# Patient Record
Sex: MALE | Race: WHITE | NOT HISPANIC OR LATINO | Employment: OTHER | URBAN - METROPOLITAN AREA
[De-identification: names, ages, dates, MRNs, and addresses within clinical notes are randomized per-mention and may not be internally consistent; named-entity substitution may affect disease eponyms.]

---

## 2018-03-06 ENCOUNTER — TRANSCRIBE ORDERS (OUTPATIENT)
Dept: LAB | Facility: HOSPITAL | Age: 48
End: 2018-03-06

## 2018-03-06 ENCOUNTER — APPOINTMENT (OUTPATIENT)
Dept: LAB | Facility: HOSPITAL | Age: 48
End: 2018-03-06
Attending: PHYSICAL MEDICINE & REHABILITATION
Payer: OTHER MISCELLANEOUS

## 2018-03-06 DIAGNOSIS — I76 SEPTIC EMBOLISM (CMS/HCC): ICD-10-CM

## 2018-03-06 DIAGNOSIS — I76 SEPTIC EMBOLISM (CMS/HCC): Primary | ICD-10-CM

## 2018-03-06 LAB
INR PPP: 2.1 INR
PROTHROMBIN TIME: 23.9 SEC. (ref 12.2–14.5)

## 2018-03-06 PROCEDURE — 85610 PROTHROMBIN TIME: CPT

## 2018-03-06 PROCEDURE — 36415 COLL VENOUS BLD VENIPUNCTURE: CPT

## 2018-05-16 ENCOUNTER — TRANSCRIBE ORDERS (OUTPATIENT)
Dept: LAB | Facility: HOSPITAL | Age: 48
End: 2018-05-16

## 2018-05-16 ENCOUNTER — APPOINTMENT (OUTPATIENT)
Dept: LAB | Facility: HOSPITAL | Age: 48
End: 2018-05-16
Attending: PSYCHIATRY & NEUROLOGY
Payer: OTHER MISCELLANEOUS

## 2018-05-16 DIAGNOSIS — Z51.81 ENCOUNTER FOR THERAPEUTIC DRUG MONITORING: ICD-10-CM

## 2018-05-16 DIAGNOSIS — Z51.81 ENCOUNTER FOR THERAPEUTIC DRUG MONITORING: Primary | ICD-10-CM

## 2018-05-16 LAB — CARBAMAZEPINE SERPL-MCNC: 8.7 UG/ML (ref 8–12)

## 2018-05-16 PROCEDURE — 36415 COLL VENOUS BLD VENIPUNCTURE: CPT

## 2018-05-16 PROCEDURE — 80156 ASSAY CARBAMAZEPINE TOTAL: CPT

## 2018-06-27 ENCOUNTER — HOSPITAL ENCOUNTER (INPATIENT)
Facility: HOSPITAL | Age: 48
LOS: 2 days | End: 2018-06-29
Attending: EMERGENCY MEDICINE | Admitting: HOSPITALIST
Payer: OTHER MISCELLANEOUS

## 2018-06-27 ENCOUNTER — APPOINTMENT (EMERGENCY)
Dept: RADIOLOGY | Facility: HOSPITAL | Age: 48
End: 2018-06-27
Payer: OTHER MISCELLANEOUS

## 2018-06-27 DIAGNOSIS — E87.1 HYPONATREMIA: ICD-10-CM

## 2018-06-27 DIAGNOSIS — R56.9 SEIZURE (CMS/HCC): Primary | ICD-10-CM

## 2018-06-27 PROBLEM — I10 ESSENTIAL HYPERTENSION: Status: ACTIVE | Noted: 2018-06-27

## 2018-06-27 PROBLEM — S06.9XAA TRAUMATIC BRAIN INJURY (CMS/HCC): Status: ACTIVE | Noted: 2018-06-27

## 2018-06-27 LAB
ALBUMIN SERPL-MCNC: 4.6 G/DL (ref 3.4–5)
ALP SERPL-CCNC: 74 IU/L (ref 35–126)
ALT SERPL-CCNC: 27 IU/L (ref 16–63)
ANION GAP SERPL CALC-SCNC: 14 MEQ/L (ref 3–15)
ANION GAP SERPL CALC-SCNC: 7 MEQ/L (ref 3–15)
AST SERPL-CCNC: 24 IU/L (ref 15–41)
BACTERIA URNS QL MICRO: NORMAL /UL
BASOPHILS # BLD: 0.03 K/UL (ref 0.01–0.1)
BASOPHILS NFR BLD: 0.3 %
BILIRUB SERPL-MCNC: 0.8 MG/DL (ref 0.3–1.2)
BILIRUB UR QL STRIP.AUTO: NEGATIVE MG/DL
BUN SERPL-MCNC: 12 MG/DL (ref 8–20)
BUN SERPL-MCNC: 9 MG/DL (ref 8–20)
CALCIUM SERPL-MCNC: 9.2 MG/DL (ref 8.9–10.3)
CALCIUM SERPL-MCNC: 9.3 MG/DL (ref 8.9–10.3)
CARBAMAZEPINE SERPL-MCNC: 6.1 UG/ML (ref 8–12)
CHLORIDE SERPL-SCNC: 90 MMOL/L (ref 98–109)
CHLORIDE SERPL-SCNC: 91 MMOL/L (ref 98–109)
CLARITY UR REFRACT.AUTO: CLEAR
CO2 SERPL-SCNC: 17 MMOL/L (ref 22–32)
CO2 SERPL-SCNC: 26 MMOL/L (ref 22–32)
COLOR UR AUTO: YELLOW
CREAT SERPL-MCNC: 0.8 MG/DL (ref 0.8–1.3)
CREAT SERPL-MCNC: 0.9 MG/DL (ref 0.8–1.3)
DIFFERENTIAL METHOD BLD: ABNORMAL
EOSINOPHIL # BLD: 0.28 K/UL (ref 0.04–0.54)
EOSINOPHIL NFR BLD: 3.1 %
ERYTHROCYTE [DISTWIDTH] IN BLOOD BY AUTOMATED COUNT: 13.3 % (ref 11.6–14.4)
GFR SERPL CREATININE-BSD FRML MDRD: >60 ML/MIN/1.73M*2
GFR SERPL CREATININE-BSD FRML MDRD: >60 ML/MIN/1.73M*2
GLUCOSE SERPL-MCNC: 101 MG/DL (ref 70–99)
GLUCOSE SERPL-MCNC: 112 MG/DL (ref 70–99)
GLUCOSE UR STRIP.AUTO-MCNC: NEGATIVE MG/DL
HCT VFR BLDCO AUTO: 38.4 % (ref 40–51)
HGB BLD-MCNC: 13.6 G/DL (ref 13.7–17.5)
HGB UR QL STRIP.AUTO: NEGATIVE
HYALINE CASTS #/AREA URNS LPF: NORMAL /LPF
IMM GRANULOCYTES # BLD AUTO: 0.18 K/UL (ref 0–0.08)
IMM GRANULOCYTES NFR BLD AUTO: 2 %
KETONES UR STRIP.AUTO-MCNC: NEGATIVE MG/DL
LEUKOCYTE ESTERASE UR QL STRIP.AUTO: NEGATIVE
LYMPHOCYTES # BLD: 1.24 K/UL (ref 1.2–3.5)
LYMPHOCYTES NFR BLD: 13.6 %
MAGNESIUM SERPL-MCNC: 2.1 MG/DL (ref 1.8–2.5)
MCH RBC QN AUTO: 30.4 PG (ref 28–33.2)
MCHC RBC AUTO-ENTMCNC: 35.4 G/DL (ref 32.2–36.5)
MCV RBC AUTO: 85.9 FL (ref 83–98)
MONOCYTES # BLD: 0.91 K/UL (ref 0.3–1)
MONOCYTES NFR BLD: 10 %
NEUTROPHILS # BLD: 6.49 K/UL (ref 1.7–7)
NEUTS SEG NFR BLD: 71 %
NITRITE UR QL STRIP.AUTO: NEGATIVE
NRBC BLD-RTO: 0 %
OSMOLALITY SERPL: 260 MOSM/KG (ref 280–300)
PDW BLD AUTO: 8.3 FL (ref 9.4–12.4)
PH UR STRIP.AUTO: 7 [PH]
PLATELET # BLD AUTO: 252 K/UL (ref 150–350)
POTASSIUM SERPL-SCNC: 3.6 MMOL/L (ref 3.6–5.1)
POTASSIUM SERPL-SCNC: 3.9 MMOL/L (ref 3.6–5.1)
PROT SERPL-MCNC: 7.7 G/DL (ref 6–8.2)
PROT UR QL STRIP.AUTO: 1
RBC # BLD AUTO: 4.47 M/UL (ref 4.5–5.8)
RBC #/AREA URNS HPF: NORMAL /HPF
SODIUM SERPL-SCNC: 121 MMOL/L (ref 136–144)
SODIUM SERPL-SCNC: 124 MMOL/L (ref 136–144)
SP GR UR REFRACT.AUTO: 1.01
SQUAMOUS URNS QL MICRO: NORMAL /HPF
TROPONIN I SERPL-MCNC: <0.02 NG/ML
UROBILINOGEN UR STRIP-ACNC: 0.2 EU/DL
WBC # BLD AUTO: 9.13 K/UL (ref 3.8–10.5)
WBC #/AREA URNS HPF: NORMAL /HPF

## 2018-06-27 PROCEDURE — 84300 ASSAY OF URINE SODIUM: CPT | Performed by: EMERGENCY MEDICINE

## 2018-06-27 PROCEDURE — 63600000 HC DRUGS/DETAIL CODE: Performed by: EMERGENCY MEDICINE

## 2018-06-27 PROCEDURE — 25800000 HC PHARMACY IV SOLUTIONS: Performed by: HOSPITALIST

## 2018-06-27 PROCEDURE — 36415 COLL VENOUS BLD VENIPUNCTURE: CPT | Performed by: PHYSICIAN ASSISTANT

## 2018-06-27 PROCEDURE — 99291 CRITICAL CARE FIRST HOUR: CPT | Performed by: HOSPITALIST

## 2018-06-27 PROCEDURE — 83930 ASSAY OF BLOOD OSMOLALITY: CPT | Performed by: EMERGENCY MEDICINE

## 2018-06-27 PROCEDURE — 70450 CT HEAD/BRAIN W/O DYE: CPT

## 2018-06-27 PROCEDURE — 84484 ASSAY OF TROPONIN QUANT: CPT | Performed by: EMERGENCY MEDICINE

## 2018-06-27 PROCEDURE — 99222 1ST HOSP IP/OBS MODERATE 55: CPT | Performed by: HOSPITALIST

## 2018-06-27 PROCEDURE — 83735 ASSAY OF MAGNESIUM: CPT | Performed by: EMERGENCY MEDICINE

## 2018-06-27 PROCEDURE — 81001 URINALYSIS AUTO W/SCOPE: CPT | Performed by: EMERGENCY MEDICINE

## 2018-06-27 PROCEDURE — 80048 BASIC METABOLIC PNL TOTAL CA: CPT | Performed by: HOSPITALIST

## 2018-06-27 PROCEDURE — 80156 ASSAY CARBAMAZEPINE TOTAL: CPT | Performed by: PHYSICIAN ASSISTANT

## 2018-06-27 PROCEDURE — 63700000 HC SELF-ADMINISTRABLE DRUG: Performed by: HOSPITALIST

## 2018-06-27 PROCEDURE — 36415 COLL VENOUS BLD VENIPUNCTURE: CPT | Performed by: HOSPITALIST

## 2018-06-27 PROCEDURE — 99285 EMERGENCY DEPT VISIT HI MDM: CPT | Mod: 25

## 2018-06-27 PROCEDURE — 83935 ASSAY OF URINE OSMOLALITY: CPT | Performed by: EMERGENCY MEDICINE

## 2018-06-27 PROCEDURE — 93005 ELECTROCARDIOGRAM TRACING: CPT | Performed by: PHYSICIAN ASSISTANT

## 2018-06-27 PROCEDURE — 20000000 HC ROOM AND CARE ICU

## 2018-06-27 PROCEDURE — 80053 COMPREHEN METABOLIC PANEL: CPT | Performed by: PHYSICIAN ASSISTANT

## 2018-06-27 PROCEDURE — 85025 COMPLETE CBC W/AUTO DIFF WBC: CPT | Performed by: PHYSICIAN ASSISTANT

## 2018-06-27 RX ORDER — LOSARTAN POTASSIUM 100 MG/1
100 TABLET ORAL EVERY MORNING
COMMUNITY

## 2018-06-27 RX ORDER — ATORVASTATIN CALCIUM 10 MG/1
10 TABLET, FILM COATED ORAL DAILY
Status: DISCONTINUED | OUTPATIENT
Start: 2018-06-28 | End: 2018-06-29 | Stop reason: HOSPADM

## 2018-06-27 RX ORDER — AMLODIPINE BESYLATE 10 MG/1
10 TABLET ORAL EVERY MORNING
COMMUNITY

## 2018-06-27 RX ORDER — TRAMADOL HYDROCHLORIDE 50 MG/1
100 TABLET ORAL
COMMUNITY
End: 2018-06-27

## 2018-06-27 RX ORDER — FLUTICASONE PROPIONATE 50 MCG
2 SPRAY, SUSPENSION (ML) NASAL EVERY MORNING
COMMUNITY

## 2018-06-27 RX ORDER — PROPRANOLOL HYDROCHLORIDE 40 MG/1
40 TABLET ORAL 2 TIMES DAILY
COMMUNITY
End: 2018-06-27

## 2018-06-27 RX ORDER — SODIUM CHLORIDE 9 MG/ML
INJECTION, SOLUTION INTRAVENOUS CONTINUOUS
Status: DISCONTINUED | OUTPATIENT
Start: 2018-06-27 | End: 2018-06-28

## 2018-06-27 RX ORDER — AMANTADINE HYDROCHLORIDE 100 MG/1
100 CAPSULE, GELATIN COATED ORAL 2 TIMES DAILY
COMMUNITY
End: 2018-06-27

## 2018-06-27 RX ORDER — ACETAMINOPHEN 325 MG/1
650 TABLET ORAL EVERY 4 HOURS PRN
Status: DISCONTINUED | OUTPATIENT
Start: 2018-06-27 | End: 2018-06-29 | Stop reason: HOSPADM

## 2018-06-27 RX ORDER — SIMVASTATIN 20 MG/1
20 TABLET, FILM COATED ORAL NIGHTLY
COMMUNITY
End: 2018-06-27

## 2018-06-27 RX ORDER — ESZOPICLONE 1 MG/1
1 TABLET, FILM COATED ORAL NIGHTLY
COMMUNITY

## 2018-06-27 RX ORDER — POTASSIUM CHLORIDE 14.9 MG/ML
20 INJECTION INTRAVENOUS DAILY PRN
Status: DISCONTINUED | OUTPATIENT
Start: 2018-06-27 | End: 2018-06-28

## 2018-06-27 RX ORDER — DEXTROSE 40 %
15-30 GEL (GRAM) ORAL AS NEEDED
Status: DISCONTINUED | OUTPATIENT
Start: 2018-06-27 | End: 2018-06-29 | Stop reason: HOSPADM

## 2018-06-27 RX ORDER — SIMVASTATIN 20 MG/1
20 TABLET, FILM COATED ORAL NIGHTLY
COMMUNITY

## 2018-06-27 RX ORDER — LORAZEPAM 2 MG/ML
1 INJECTION INTRAMUSCULAR ONCE
Status: COMPLETED | OUTPATIENT
Start: 2018-06-27 | End: 2018-06-27

## 2018-06-27 RX ORDER — IBUPROFEN 200 MG
16-32 TABLET ORAL AS NEEDED
Status: DISCONTINUED | OUTPATIENT
Start: 2018-06-27 | End: 2018-06-29 | Stop reason: HOSPADM

## 2018-06-27 RX ORDER — CARBAMAZEPINE 100 MG/1
400 TABLET, CHEWABLE ORAL NIGHTLY
Status: DISCONTINUED | OUTPATIENT
Start: 2018-06-27 | End: 2018-06-27

## 2018-06-27 RX ORDER — KETOROLAC TROMETHAMINE 15 MG/ML
15 INJECTION, SOLUTION INTRAMUSCULAR; INTRAVENOUS EVERY 6 HOURS PRN
Status: DISCONTINUED | OUTPATIENT
Start: 2018-06-27 | End: 2018-06-29 | Stop reason: HOSPADM

## 2018-06-27 RX ORDER — CARBAMAZEPINE 400 MG/1
1200 TABLET, EXTENDED RELEASE ORAL NIGHTLY
COMMUNITY

## 2018-06-27 RX ORDER — PROPRANOLOL HYDROCHLORIDE 20 MG/1
40 TABLET ORAL 2 TIMES DAILY
Status: DISCONTINUED | OUTPATIENT
Start: 2018-06-27 | End: 2018-06-29 | Stop reason: HOSPADM

## 2018-06-27 RX ORDER — AMANTADINE HYDROCHLORIDE 100 MG/1
50 CAPSULE, GELATIN COATED ORAL 2 TIMES DAILY
COMMUNITY
End: 2018-06-29 | Stop reason: HOSPADM

## 2018-06-27 RX ORDER — LOSARTAN POTASSIUM 100 MG/1
100 TABLET ORAL DAILY
Status: DISCONTINUED | OUTPATIENT
Start: 2018-06-28 | End: 2018-06-29 | Stop reason: HOSPADM

## 2018-06-27 RX ORDER — CARBAMAZEPINE 200 MG/1
1200 TABLET, EXTENDED RELEASE ORAL DAILY
Status: DISCONTINUED | OUTPATIENT
Start: 2018-06-28 | End: 2018-06-29 | Stop reason: HOSPADM

## 2018-06-27 RX ORDER — DIAZEPAM 10 MG/2ML
5 INJECTION INTRAMUSCULAR EVERY 6 HOURS PRN
Status: DISCONTINUED | OUTPATIENT
Start: 2018-06-27 | End: 2018-06-29 | Stop reason: HOSPADM

## 2018-06-27 RX ORDER — TRAMADOL HYDROCHLORIDE 50 MG/1
100 TABLET ORAL
Status: DISCONTINUED | OUTPATIENT
Start: 2018-06-28 | End: 2018-06-27

## 2018-06-27 RX ORDER — PROPRANOLOL HYDROCHLORIDE 40 MG/1
40 TABLET ORAL 2 TIMES DAILY
COMMUNITY

## 2018-06-27 RX ORDER — ONDANSETRON HYDROCHLORIDE 2 MG/ML
4 INJECTION, SOLUTION INTRAVENOUS EVERY 8 HOURS PRN
Status: DISCONTINUED | OUTPATIENT
Start: 2018-06-27 | End: 2018-06-29 | Stop reason: HOSPADM

## 2018-06-27 RX ORDER — DEXTROSE 50 % IN WATER (D50W) INTRAVENOUS SYRINGE
25 AS NEEDED
Status: DISCONTINUED | OUTPATIENT
Start: 2018-06-27 | End: 2018-06-29 | Stop reason: HOSPADM

## 2018-06-27 RX ORDER — POTASSIUM CHLORIDE 750 MG/1
20 TABLET, FILM COATED, EXTENDED RELEASE ORAL ONCE
Status: COMPLETED | OUTPATIENT
Start: 2018-06-28 | End: 2018-06-27

## 2018-06-27 RX ORDER — AMANTADINE HYDROCHLORIDE 100 MG/1
100 CAPSULE, GELATIN COATED ORAL 2 TIMES DAILY
Status: DISCONTINUED | OUTPATIENT
Start: 2018-06-28 | End: 2018-06-28

## 2018-06-27 RX ORDER — AMLODIPINE BESYLATE 10 MG/1
10 TABLET ORAL DAILY
Status: DISCONTINUED | OUTPATIENT
Start: 2018-06-28 | End: 2018-06-29 | Stop reason: HOSPADM

## 2018-06-27 RX ORDER — MIRTAZAPINE 15 MG/1
45 TABLET, FILM COATED ORAL
Status: DISCONTINUED | OUTPATIENT
Start: 2018-06-28 | End: 2018-06-27

## 2018-06-27 RX ORDER — POLYETHYLENE GLYCOL 3350 17 G/17G
17 POWDER, FOR SOLUTION ORAL DAILY
Status: DISCONTINUED | OUTPATIENT
Start: 2018-06-28 | End: 2018-06-29 | Stop reason: HOSPADM

## 2018-06-27 RX ORDER — ONDANSETRON 4 MG/1
4 TABLET, ORALLY DISINTEGRATING ORAL EVERY 8 HOURS PRN
Status: DISCONTINUED | OUTPATIENT
Start: 2018-06-27 | End: 2018-06-29 | Stop reason: HOSPADM

## 2018-06-27 RX ADMIN — LORAZEPAM 1 MG: 2 INJECTION INTRAMUSCULAR; INTRAVENOUS at 17:45

## 2018-06-27 RX ADMIN — CARBAMAZEPINE 1200 MG: 200 TABLET, EXTENDED RELEASE ORAL at 23:57

## 2018-06-27 RX ADMIN — POTASSIUM CHLORIDE 20 MEQ: 750 TABLET, EXTENDED RELEASE ORAL at 23:57

## 2018-06-27 RX ADMIN — SODIUM CHLORIDE: 9 INJECTION, SOLUTION INTRAVENOUS at 20:56

## 2018-06-27 ASSESSMENT — COGNITIVE AND FUNCTIONAL STATUS - GENERAL
CLIMB 3 TO 5 STEPS WITH RAILING: 4 - NONE
MOVING TO AND FROM BED TO CHAIR: 4 - NONE
EATING MEALS: 3 - A LITTLE
WALKING IN HOSPITAL ROOM: 4 - NONE
HELP NEEDED FOR PERSONAL GROOMING: 3 - A LITTLE
STANDING UP FROM CHAIR USING ARMS: 4 - NONE
DRESSING REGULAR UPPER BODY CLOTHING: 4 - NONE
HELP NEEDED FOR BATHING: 4 - NONE
TOILETING: 4 - NONE
DRESSING REGULAR LOWER BODY CLOTHING: 4 - NONE

## 2018-06-27 ASSESSMENT — ENCOUNTER SYMPTOMS: SEIZURES: 1

## 2018-06-27 NOTE — ED ATTESTATION NOTE
4:54 PM  I have personally seen and examined the patient.  I reviewed and agree with physician assistant / nurse practitioner’s assessment and plan of care, with the following exceptions: None  My examination, assessment, and plan of care of Christopher Mattson is as follows:     48-year-old male from [facility] is here because he suffered a tonic/clonic seizure.  His caretaker states that he has no history of previous seizures, however he is on Tegretol for mood control.  There is a list of previous medications on his record documenting the use of valproic acid.  The patient is antsy according to his caretaker.  He denies biting his tongue or suffering incontinence.  Examination does in fact reveal a left tongue abrasion.  Heart is regular without tachycardia.  Lungs are clear and abdomen is soft and nontender.  There is 3+ reflexes in his lower extremities.  The caretaker states that the patient has been drinking quite a bit of free water today.  The patient will be evaluated with CAT scan of the brain and lab work, including Tegretol level.    5:34 PM  Patient electrolytes reveal a sodium of 121, chloride of 70, serum CO2 of 17.  The patient will need to be admitted with fluid restriction and I am giving him a dose of Ativan at this time.  Admitted to the [facility] service.  Critical care 30 minutes.       I was physically present for the key/critical portions of the following procedures: None           Rey Wyman, DO  06/27/18 1735       Rey Wyman, DO  06/27/18 1739

## 2018-06-27 NOTE — H&P
Hospital Medicine Service -  History & Physical        CHIEF COMPLAINT     Chief Complaint   Patient presents with   • Seizures        HISTORY OF PRESENT ILLNESS      48 y.o. male with a past medical history of TBI with subdural hematoma, hypertension. Lives at drug rehab facility and will be discharged on 6/29/2018.  Patient was sitting where in front of the  patient had a tonic-clonic seizure no loss of continence.  According to staff members it lasted for 3 minutes as EMS arrived and patient was postictal at that time.  Here in the ED patient only remembers the portion of the ambulance ride.  Complains of dizziness headache.  Patient does admit to drinking at least a gallon and a half of water daily according to family members to relieve self stress.  Here in the ED CT scan of the head was no change from the previous CT.  Sodium is 121.  Rest of the labs are unremarkable.  Because of the seizure and hypo-natremia patient will be admitted for further evaluation and treatment    PAST MEDICAL AND SURGICAL HISTORY      Past Medical History:   Diagnosis Date   • Head injury    • Subarachnoid hematoma (CMS/HCC) (HCC)    • Subdural hematoma (CMS/HCC) (HCC)    • Trauma        History reviewed. No pertinent surgical history.    MEDICATIONS      Prior to Admission medications    Medication Sig Start Date End Date Taking? Authorizing Provider   amantadine (SYMMETREL) 100 mg capsule Take 100 mg by mouth 2 (two) times a day. At 8a and 1p   Yes Historical Provider, MD   amLODIPine (NORVASC) 10 mg tablet Take 10 mg by mouth every morning.     Yes Historical Provider, MD   carBAMazepine XR (TEGretol  XR) 400 mg 12 hr tablet Take 1,200 mg by mouth nightly.     Yes Historical Provider, MD   eszopiclone (LUNESTA) 1 mg tablet Take 1 mg by mouth nightly. Take immediately before bedtime   Yes Historical Provider, MD   fluticasone (FLONASE) 50 mcg/actuation nasal spray Administer 2 sprays into each nostril every  morning.     Yes Historical Provider, MD   losartan (COZAAR) 100 mg tablet Take 100 mg by mouth every morning.     Yes Historical Provider, MD   propranolol (INDERAL) 40 mg tablet Take 40 mg by mouth 2 (two) times a day.   Yes Historical Provider, MD   simvastatin (ZOCOR) 20 mg tablet Take 20 mg by mouth nightly.   Yes Historical Provider, MD   amantadine (SYMMETREL) 100 mg capsule Take 100 mg by mouth 2 (two) times a day.  6/27/18 Yes Historical Provider, MD   divalproex sodium (DEPAKOTE ORAL) Take 1,200 mg by mouth daily.  6/27/18 Yes Historical Provider, MD   MIRTAZAPINE ORAL Take 45 mg by mouth daily.  6/27/18 Yes Historical Provider, MD   propranolol (INDERAL) 40 mg tablet Take 40 mg by mouth 2 (two) times a day.  6/27/18 Yes Historical Provider, MD   simvastatin (ZOCOR) 20 mg tablet Take 20 mg by mouth nightly.  6/27/18 Yes Historical Provider, MD   traMADol (ULTRAM) 50 mg tablet Take 100 mg by mouth daily.  6/27/18 Yes Historical Provider, MD       ALLERGIES      Patient has no known allergies.    FAMILY HISTORY      History reviewed. No pertinent family history.    SOCIAL HISTORY      Social History     Social History   • Marital status:      Spouse name: N/A   • Number of children: N/A   • Years of education: N/A     Social History Main Topics   • Smoking status: Former Smoker   • Smokeless tobacco: None   • Alcohol use No   • Drug use: No   • Sexual activity: No     Other Topics Concern   • None     Social History Narrative   • None       REVIEW OF SYSTEMS        Review of Systems  Constitutional: Negative for fatigue and unexpected weight change.   Eyes: Negative for visual disturbance. Mouth no sore throat  Respiratory: Negative for cough and shortness of breath.    Cardiovascular: Negative for chest pain and palpitations.   Gastrointestinal: Negative for abdominal pain, constipation, diarrhea, nausea and vomiting.   Genitourinary: Negative for difficulty urinating. no hematuria no frequency no  urgency no discharge  Musculoskeletal: Negative for arthralgias.   Skin: Negative for rash.   Neurological: Has seizure, dizziness and headaches.   Hematological: Does not bruise/bleed easily.   Psychiatric/Behavioral: Negative for confusion and dysphoric mood no suicidal ideations          PHYSICAL EXAMINATION      Temp:  [36.3 °C (97.3 °F)-36.8 °C (98.2 °F)] 36.7 °C (98 °F)  Heart Rate:  [72-76] 72  Resp:  [15-55] 18  BP: (134-145)/(73-84) 143/77  Body mass index is 28.6 kg/m².    General exam : appears age stated, well nourished, not in distress  Head: atraumatic, normocephalic  Eyes : PERRLA, EOMI, no pallor, no icterus  ENT: no lesions, oropharynx pink, mucous membranes moist   Neck: supple, no Lymph nodes, no Thyromegaly, no JVD   CVS : normal rate, normal rhythm, S1 and S2 heard, no murmurs, rubs or gallops  Resp:normal accessory muscle usage, clear to auscultation Bilaterally  Abdomen : soft, Nt, BS +, no organomegaly   Extremities : no edema, no cyanosis   MSK: no DJD, no joint swellings, no joint tenderness   Skin: intact, warm, no rash  Neuro: AAO x3, CN 2-12 intact,  motor strength in all extremities, sensations, DTRs, coordination intact.  Psych: normal mood.cooperative      LABS / IMAGING / EKG        Labs  CBC Results       06/27/18                          1636           WBC 9.13           RBC 4.47 (L)           HGB 13.6 (L)           HCT 38.4 (L)           MCV 85.9           MCH 30.4           MCHC 35.4                                    CMP Results       06/27/18                          1636            (LL)           K 3.9           Cl 90 (L)           CO2 17 (L)           Glucose 112 (H)           BUN 12           Creatinine 0.9           Calcium 9.2           Anion Gap 14           AST 24           ALT 27           Albumin 4.6           EGFR &gt;60.0           Comment for K at 1636 on 06/27/18:    Results obtained on plasma. Plasma Potassium values may be up to 0.4 mEQ/L less than  serum values. The differences may be greater for patients with high platelet or white cell counts.          Troponin I Results       06/27/18                          1638           Troponin I &lt;0.02                         Microbiology Results     ** No results found for the last 720 hours. **        UA Results       06/27/18                          1840           Color Yellow           Clarity Clear           Glucose Negative           Bilirubin Negative           Ketones Negative           Sp Grav 1.014           Blood Negative           Ph 7.0           Protein +1 (A)           Urobilinogen 0.2           Nitrite Negative           Leuk Est Negative           WBC 0 TO 3           RBC 0 TO 4           Bacteria None Seen           Comment for Blood at 1840 on 06/27/18:    The sensitivity of the occult blood test is equivalent to approximately 4 intact RBC/HPF.    Comment for Leuk Est at 1840 on 06/27/18:    Results can be falsely negative due to high specific gravity, some antibiotics, glucose >3 g/dl, or WBC other than neutrophils.          Imaging  CT HEAD WITHOUT IV CONTRAST   Final Result   IMPRESSION: Atrophy.   Large areas of gliosis involving the right frontal lobe with smaller areas of   gliosis involving the right temporal lobe and left frontal lobe, which are   presumably due to an remote injury.   There is a small area of gliosis involving gray and white matter in the right   parietal lobe, of uncertain age; please see below.   Sinusitis.      COMMENT: 2.5 mm axial scans through the brain were performed without intravenous   contrast.  Images were reconstructed in the coronal and sagittal planes..      We have no prior studies for comparison.      CT DOSE:  One or more dose reduction techniques (e.g. automated exposure   control, adjustment of the mA and/or kV according to patient size, use of   iterative reconstruction technique) was utilized for this examination..      There is prominence of the  cortical sulci and ventricular system, consistent   with atrophy, more advanced than be expected for a patient of this age.  This   may be a posttraumatic change.      There is a large area of gliosis involving gray and white matter of the right   frontal lobe, a moderate sized area of gliosis involving gray and white matter   of the right temporal lobe, and a small area of gliosis involving the gray and   white matter of the floor of the left frontal lobe.  These are presumably all   related to remote injury.      There is a smaller area of low density involving gray and white matter in the   right parietal lobe (image 41); this could also be related to prior injury, but   this is not as low in density as the other areas of gliosis, although this could   be technical due to its smaller size.  Alternatively, this could be new,   possibly a subacute ischemic event.  Comparison with prior studies is advised.   If unavailable, follow-up MRI may be of further diagnostic value.      There is a small flory hole in the right frontal bone with low density in a   ventriculoperitoneal shunt tract; the catheter is no longer present.      There are no other areas of abnormal high or low density seen throughout the   brain.      No mass, hemorrhage, or midline shift is seen.  No extra-axial collections are   identified.      There appears to have been prior sinus surgery.  There is fairly extensive   mucosal thickening in the roofs of the maxillary sinuses, ethmoid sinuses, and   sphenoid sinuses.      These results were transmitted to the Emergency Department at the time of   dictation.      ECG 12 lead    (Results Pending)         ECG/Telemetry  reviewed by me    ASSESSMENT AND PLAN           * Hyponatremia   Assessment & Plan    To PCU under [facility]  IV normal saline  Serial BMPs  Consult nephrology  Fluid restriction        Essential hypertension   Assessment & Plan    Continue home meds   Hold losartan as it may contribute to  the hyponatremia        Traumatic brain injury (CMS/[facility]) ([facility])   Assessment & Plan    Same as above  DVT prophylaxis with SCDs  Patient is a full code        Seizure (CMS/[facility]) ([facility])   Assessment & Plan    Neurochecks  Seizure precautions        Spent 35 minutes with critical care    VTE Assessment: Padua VTE Score: 3  VTE Prophylaxis Plan: SCDs  Code Status: Full Code       Vito Isaca MD  6/27/2018

## 2018-06-27 NOTE — ED PROVIDER NOTES
HPI     Chief Complaint   Patient presents with   • Seizures       47 y/o M w/ Pmhx of traumatic brain injury (9/5/17) presents to ED for evaluation s/p seizure. Pt had a witnessed seizure at [facility] earlier today while in his chair. Pt states feeling nauseous preceding episode. Per caregiver, pt had a tonic seizure w/ stiffening and unresponsiveness. Denies hx of seizure. Hx and Ros is limited due to mental status change.         History provided by:  Patient and caregiver  History limited by:  Mental status change   used: No    Seizures   Seizure activity on arrival: no    Seizure type:  Tonic  Preceding symptoms: nausea    Initial focality:  Diffuse  Episode characteristics: stiffening and unresponsiveness    Return to baseline: yes    Severity:  Moderate  Timing:  Once  Progression:  Improving  Context: previous head injury (tramatic brain injury (9/5/17))    Recent head injury:  No recent head injuries  History of seizures: no         Patient History     No past medical history on file.    No past surgical history on file.    No family history on file.    Social History   Substance Use Topics   • Smoking status: Not on file   • Smokeless tobacco: Not on file   • Alcohol use Not on file       Systems Reviewed from Nursing Triage:          Review of Systems     Review of Systems   Unable to perform ROS: Mental status change   Neurological: Positive for seizures.        Physical Exam     ED Triage Vitals   Temp Pulse Resp BP SpO2   -- -- -- -- --      Temp src Heart Rate Source Patient Position BP Location FiO2 (%) (Set)   -- -- -- -- --                     No data found.          Physical Exam   Constitutional: He appears well-developed and well-nourished.   HENT:   Head: Normocephalic and atraumatic.   Eyes: Conjunctivae and EOM are normal. Pupils are equal, round, and reactive to light.   Neck: Normal range of motion. Neck supple.   Cardiovascular: Normal rate, regular rhythm, normal heart  sounds and intact distal pulses.    Pulmonary/Chest: Effort normal and breath sounds normal. No respiratory distress. He has no wheezes. He has no rales.   Abdominal: Soft. Bowel sounds are normal.   Neurological: He is alert. No cranial nerve deficit.   Skin: Skin is warm and dry.   Nursing note and vitals reviewed.           Procedures    ED Course & MDM     Labs Reviewed - No data to display    No orders to display           MDM         ED Course as of Jun 27 1636   Wed Jun 27, 2018   1635 47 y/o M presents to ED for evaluation of seizure. Plan to check labs and CT head.  [DP]      ED Course User Index  [DP] Calixto Velasco (David)       Scribe Attestation  By signing my name below, I, Calixto Velasco (David), attest that this documentation has been prepared under the direction and in the presence of Yuli Jane PA-C..  6/27/2018 4:33 PM    Provider Attestation  ***           Calixto Velasco (David)  06/27/18 5646

## 2018-06-27 NOTE — ED PROVIDER NOTES
HPI     Chief Complaint   Patient presents with   • Seizures       HPI     Patient History     Past Medical History:   Diagnosis Date   • Head injury    • Subarachnoid hematoma (CMS/HCC) (HCC)    • Subdural hematoma (CMS/HCC) (HCC)    • Trauma        History reviewed. No pertinent surgical history.    History reviewed. No pertinent family history.    Social History   Substance Use Topics   • Smoking status: Former Smoker   • Smokeless tobacco: Not on file   • Alcohol use No       Systems Reviewed from Nursing Triage:          Review of Systems     Review of Systems     Physical Exam     ED Triage Vitals [06/27/18 1623]   Temp Pulse Resp BP SpO2   -- -- 16 (!) 143/78 100 %      Temp src Heart Rate Source Patient Position BP Location FiO2 (%) (Set)   -- -- Lying Right upper arm --       Pulse Ox %: 100 % (06/27/18 1635)  Pulse Ox Interpretation: Normal (06/27/18 1635)  Heart Rate: 83 (06/27/18 1635)  Rhythm Strip Interpretation: Normal Sinus Rhythm (06/27/18 1635)    Patient Vitals for the past 24 hrs:   BP Resp SpO2 Height Weight   06/27/18 1623 (!) 143/78 16 100 % 1.829 m (6') 99.8 kg (220 lb)           Physical Exam         Procedures    ED Course & MDM     Labs Reviewed   COMPREHENSIVE METABOLIC PANEL - Abnormal        Result Value    Sodium 121 (*)     Chloride 90 (*)     CO2 17 (*)     Glucose 112 (*)     Potassium 3.9      BUN 12      Creatinine 0.9      Calcium 9.2      AST (SGOT) 24      ALT (SGPT) 27      Alkaline Phosphatase 74      Total Protein 7.7      Albumin 4.6      Bilirubin, Total 0.8      eGFR >60.0      Anion Gap 14     CARBAMAZEPINE, TOTAL - Abnormal     Carbamazepine Lvl 6.1 (*)    CBC - Abnormal     RBC 4.47 (*)     Hemoglobin 13.6 (*)     Hematocrit 38.4 (*)     MPV 8.3 (*)     WBC 9.13      MCV 85.9      MCH 30.4      MCHC 35.4      RDW 13.3      Platelets 252     DIFF COUNT - Abnormal     Immature Granulocytes, Absolute 0.18 (*)     Differential Type Auto      nRBC 0.0      Immature  Granulocytes 2.0      Neutrophils 71.0      Lymphocytes 13.6      Monocytes 10.0      Eosinophils 3.1      Basophils 0.3      Neutrophils, Absolute 6.49      Lymphocytes, Absolute 1.24      Monocytes, Absolute 0.91      Eosinophils, Absolute 0.28      Basophils, Absolute 0.03     MAGNESIUM - Normal    Magnesium 2.1     TROPONIN I - Normal    Troponin I <0.02     CBC AND DIFFERENTIAL    Narrative:     The following orders were created for panel order CBC and differential.  Procedure                               Abnormality         Status                     ---------                               -----------         ------                     CBC[[id number]]                           Abnormal            Final result               Diff Count[[id number]]                    Abnormal            Final result                 Please view results for these tests on the individual orders.   RAINBOW DRAW PANEL    Narrative:     The following orders were created for panel order Roby Draw Panel.  Procedure                               Abnormality         Status                     ---------                               -----------         ------                     RAINBOW LT BLUE[[id number]]                                   In process                 RAINBOW LT GREEN[[id number]]                                  In process                 RAINBOW GOLD[[id number]]                                      In process                   Please view results for these tests on the individual orders.   RAINBOW LT BLUE   RAINBOW LT GREEN   RAINBOW GOLD       ECG 12 lead    (Results Pending)   CT HEAD WITHOUT IV CONTRAST    (Results Pending)           MDM   5:35 PM  48-year-old male from [place] is here because he suffered a tonic/clonic seizure.  His caretaker states that he has no history of previous seizures, however he is on Tegretol for mood control.  There is a list of previous medications on his record documenting the use of valproic acid.   The patient is antsy according to his caretaker.  He denies biting his tongue or suffering incontinence.  Examination does in fact reveal a left tongue abrasion.  Heart is regular without tachycardia.  Lungs are clear and abdomen is soft and nontender.  There is 3+ reflexes in his lower extremities.  The caretaker states that the patient has been drinking quite a bit of free water today.  The patient will be evaluated with CAT scan of the brain and lab work, including Tegretol level.    5:34 PM  Patient electrolytes reveal a sodium of 121, chloride of 70, serum CO2 of 17.  The patient will need to be admitted with fluid restriction and I am giving him a dose of Ativan at this time.  Admitted to the [facility] service.  Critical care 30 minutes.             ED Course as of Jun 27 1951 Wed Jun 27, 2018   1635 49 y/o M presents to ED for evaluation of seizure. Plan to check labs and CT head.  [DP]   1736 Sodium 121. Likely cause of symptoms. Pt states he has been drinking a lot of water lately. Discussed with patients wife, hipolito. Will admit to [facility].   [LG]      ED Course User Index  [DP] Calixto Pickett (Jose) Krista  [LG] NAWAF Christine         Clinical Impressions as of Jun 27 1951   Seizure (CMS/HCC) ([facility])   Hyponatremia        Rey Wyman, DO  06/27/18 1736       Rey Wyman, DO  06/27/18 1736       Rey Wyman, DO  06/27/18 1952

## 2018-06-28 LAB
ANION GAP SERPL CALC-SCNC: 10 MEQ/L (ref 3–15)
ANION GAP SERPL CALC-SCNC: 6 MEQ/L (ref 3–15)
ANION GAP SERPL CALC-SCNC: 7 MEQ/L (ref 3–15)
ANION GAP SERPL CALC-SCNC: 8 MEQ/L (ref 3–15)
ANION GAP SERPL CALC-SCNC: 8 MEQ/L (ref 3–15)
ATRIAL RATE: 67
BASOPHILS # BLD: 0.02 K/UL (ref 0.01–0.1)
BASOPHILS NFR BLD: 0.3 %
BUN SERPL-MCNC: 11 MG/DL (ref 8–20)
BUN SERPL-MCNC: 12 MG/DL (ref 8–20)
BUN SERPL-MCNC: 12 MG/DL (ref 8–20)
BUN SERPL-MCNC: 9 MG/DL (ref 8–20)
BUN SERPL-MCNC: 9 MG/DL (ref 8–20)
CALCIUM SERPL-MCNC: 8.9 MG/DL (ref 8.9–10.3)
CALCIUM SERPL-MCNC: 9.1 MG/DL (ref 8.9–10.3)
CALCIUM SERPL-MCNC: 9.2 MG/DL (ref 8.9–10.3)
CALCIUM SERPL-MCNC: 9.2 MG/DL (ref 8.9–10.3)
CALCIUM SERPL-MCNC: 9.5 MG/DL (ref 8.9–10.3)
CHLORIDE SERPL-SCNC: 94 MMOL/L (ref 98–109)
CHLORIDE SERPL-SCNC: 95 MMOL/L (ref 98–109)
CHLORIDE SERPL-SCNC: 95 MMOL/L (ref 98–109)
CHLORIDE SERPL-SCNC: 96 MMOL/L (ref 98–109)
CHLORIDE SERPL-SCNC: 98 MMOL/L (ref 98–109)
CO2 SERPL-SCNC: 23 MMOL/L (ref 22–32)
CO2 SERPL-SCNC: 24 MMOL/L (ref 22–32)
CO2 SERPL-SCNC: 24 MMOL/L (ref 22–32)
CO2 SERPL-SCNC: 25 MMOL/L (ref 22–32)
CO2 SERPL-SCNC: 25 MMOL/L (ref 22–32)
CREAT SERPL-MCNC: 0.9 MG/DL (ref 0.8–1.3)
CREAT SERPL-MCNC: 1 MG/DL (ref 0.8–1.3)
CREAT SERPL-MCNC: 1 MG/DL (ref 0.8–1.3)
CREAT SERPL-MCNC: 1.1 MG/DL (ref 0.8–1.3)
CREAT SERPL-MCNC: 1.1 MG/DL (ref 0.8–1.3)
DIFFERENTIAL METHOD BLD: ABNORMAL
EOSINOPHIL # BLD: 0.19 K/UL (ref 0.04–0.54)
EOSINOPHIL NFR BLD: 2.6 %
ERYTHROCYTE [DISTWIDTH] IN BLOOD BY AUTOMATED COUNT: 13.5 % (ref 11.6–14.4)
GFR SERPL CREATININE-BSD FRML MDRD: >60 ML/MIN/1.73M*2
GLUCOSE SERPL-MCNC: 108 MG/DL (ref 70–99)
GLUCOSE SERPL-MCNC: 121 MG/DL (ref 70–99)
GLUCOSE SERPL-MCNC: 135 MG/DL (ref 70–99)
GLUCOSE SERPL-MCNC: 153 MG/DL (ref 70–99)
GLUCOSE SERPL-MCNC: 82 MG/DL (ref 70–99)
HCT VFR BLDCO AUTO: 35 % (ref 40–51)
HGB BLD-MCNC: 12.8 G/DL (ref 13.7–17.5)
IMM GRANULOCYTES # BLD AUTO: 0.04 K/UL (ref 0–0.08)
IMM GRANULOCYTES NFR BLD AUTO: 0.5 %
LYMPHOCYTES # BLD: 1.05 K/UL (ref 1.2–3.5)
LYMPHOCYTES NFR BLD: 14.2 %
MAGNESIUM SERPL-MCNC: 2.2 MG/DL (ref 1.8–2.5)
MCH RBC QN AUTO: 30.6 PG (ref 28–33.2)
MCHC RBC AUTO-ENTMCNC: 36.6 G/DL (ref 32.2–36.5)
MCV RBC AUTO: 83.7 FL (ref 83–98)
MONOCYTES # BLD: 0.81 K/UL (ref 0.3–1)
MONOCYTES NFR BLD: 10.9 %
MRSA DNA SPEC QL NAA+PROBE: NEGATIVE
NEUTROPHILS # BLD: 5.3 K/UL (ref 1.7–7)
NEUTS SEG NFR BLD: 71.5 %
NRBC BLD-RTO: 0 %
OSMOLALITY UR: 188 MOSM/KG (ref 100–1400)
P AXIS: 35
PDW BLD AUTO: 8.4 FL (ref 9.4–12.4)
PLATELET # BLD AUTO: 187 K/UL (ref 150–350)
POTASSIUM SERPL-SCNC: 4 MMOL/L (ref 3.6–5.1)
POTASSIUM SERPL-SCNC: 4.1 MMOL/L (ref 3.6–5.1)
POTASSIUM SERPL-SCNC: 4.2 MMOL/L (ref 3.6–5.1)
POTASSIUM SERPL-SCNC: 4.2 MMOL/L (ref 3.6–5.1)
POTASSIUM SERPL-SCNC: 4.5 MMOL/L (ref 3.6–5.1)
PR INTERVAL: 160
QRS DURATION: 104
QT INTERVAL: 402
QTC CALCULATION(BAZETT): 424
R AXIS: 15
RAINBOW HOLD SPECIMEN: NORMAL
RBC # BLD AUTO: 4.18 M/UL (ref 4.5–5.8)
SODIUM SERPL-SCNC: 126 MMOL/L (ref 136–144)
SODIUM SERPL-SCNC: 128 MMOL/L (ref 136–144)
SODIUM UR-SCNC: 27 MMOL/L
T WAVE AXIS: 21
VENTRICULAR RATE: 67
WBC # BLD AUTO: 7.41 K/UL (ref 3.8–10.5)

## 2018-06-28 PROCEDURE — 63600000 HC DRUGS/DETAIL CODE: Performed by: HOSPITALIST

## 2018-06-28 PROCEDURE — 99233 SBSQ HOSP IP/OBS HIGH 50: CPT | Performed by: INTERNAL MEDICINE

## 2018-06-28 PROCEDURE — 85025 COMPLETE CBC W/AUTO DIFF WBC: CPT | Performed by: HOSPITALIST

## 2018-06-28 PROCEDURE — 36415 COLL VENOUS BLD VENIPUNCTURE: CPT | Performed by: HOSPITALIST

## 2018-06-28 PROCEDURE — 80048 BASIC METABOLIC PNL TOTAL CA: CPT | Performed by: INTERNAL MEDICINE

## 2018-06-28 PROCEDURE — 83735 ASSAY OF MAGNESIUM: CPT | Performed by: HOSPITALIST

## 2018-06-28 PROCEDURE — 80048 BASIC METABOLIC PNL TOTAL CA: CPT | Performed by: HOSPITALIST

## 2018-06-28 PROCEDURE — 20000000 HC ROOM AND CARE ICU

## 2018-06-28 PROCEDURE — 63600000 HC DRUGS/DETAIL CODE: Performed by: INTERNAL MEDICINE

## 2018-06-28 PROCEDURE — 25800000 HC PHARMACY IV SOLUTIONS: Performed by: HOSPITALIST

## 2018-06-28 PROCEDURE — 63700000 HC SELF-ADMINISTRABLE DRUG: Performed by: HOSPITALIST

## 2018-06-28 PROCEDURE — 87641 MR-STAPH DNA AMP PROBE: CPT | Performed by: HOSPITALIST

## 2018-06-28 RX ORDER — AMANTADINE HYDROCHLORIDE 50 MG/5ML
50 SOLUTION ORAL 2 TIMES DAILY
Status: DISCONTINUED | OUTPATIENT
Start: 2018-06-28 | End: 2018-06-28

## 2018-06-28 RX ORDER — POTASSIUM CHLORIDE 750 MG/1
40 TABLET, FILM COATED, EXTENDED RELEASE ORAL DAILY PRN
Status: DISCONTINUED | OUTPATIENT
Start: 2018-06-28 | End: 2018-06-29 | Stop reason: HOSPADM

## 2018-06-28 RX ORDER — POTASSIUM CHLORIDE 14.9 MG/ML
20 INJECTION INTRAVENOUS DAILY PRN
Status: DISCONTINUED | OUTPATIENT
Start: 2018-06-28 | End: 2018-06-29 | Stop reason: HOSPADM

## 2018-06-28 RX ORDER — POTASSIUM CHLORIDE 750 MG/1
20 TABLET, FILM COATED, EXTENDED RELEASE ORAL DAILY PRN
Status: DISCONTINUED | OUTPATIENT
Start: 2018-06-28 | End: 2018-06-29 | Stop reason: HOSPADM

## 2018-06-28 RX ORDER — AMANTADINE HYDROCHLORIDE 50 MG/5ML
50 SOLUTION ORAL 2 TIMES DAILY
Status: DISCONTINUED | OUTPATIENT
Start: 2018-06-29 | End: 2018-06-29 | Stop reason: HOSPADM

## 2018-06-28 RX ORDER — HEPARIN SODIUM 5000 [USP'U]/ML
5000 INJECTION, SOLUTION INTRAVENOUS; SUBCUTANEOUS EVERY 8 HOURS
Status: DISCONTINUED | OUTPATIENT
Start: 2018-06-28 | End: 2018-06-29 | Stop reason: HOSPADM

## 2018-06-28 RX ADMIN — AMLODIPINE BESYLATE 10 MG: 10 TABLET ORAL at 08:57

## 2018-06-28 RX ADMIN — DIAZEPAM 5 MG: 5 INJECTION, SOLUTION INTRAMUSCULAR; INTRAVENOUS at 08:34

## 2018-06-28 RX ADMIN — AMANTADINE HYDROCHLORIDE 100 MG: 100 CAPSULE ORAL at 08:57

## 2018-06-28 RX ADMIN — LOSARTAN POTASSIUM 100 MG: 100 TABLET, FILM COATED ORAL at 08:53

## 2018-06-28 RX ADMIN — CARBAMAZEPINE 1200 MG: 200 TABLET, EXTENDED RELEASE ORAL at 21:56

## 2018-06-28 RX ADMIN — ACETAMINOPHEN 650 MG: 325 TABLET ORAL at 00:17

## 2018-06-28 RX ADMIN — SODIUM CHLORIDE: 9 INJECTION, SOLUTION INTRAVENOUS at 09:54

## 2018-06-28 RX ADMIN — PROPRANOLOL HYDROCHLORIDE 40 MG: 20 TABLET ORAL at 08:52

## 2018-06-28 RX ADMIN — PROPRANOLOL HYDROCHLORIDE 40 MG: 20 TABLET ORAL at 20:17

## 2018-06-28 RX ADMIN — KETOROLAC TROMETHAMINE 15 MG: 15 INJECTION, SOLUTION INTRAMUSCULAR; INTRAVENOUS at 12:53

## 2018-06-28 RX ADMIN — HEPARIN SODIUM 5000 UNITS: 5000 INJECTION, SOLUTION INTRAVENOUS; SUBCUTANEOUS at 13:55

## 2018-06-28 RX ADMIN — HEPARIN SODIUM 5000 UNITS: 5000 INJECTION, SOLUTION INTRAVENOUS; SUBCUTANEOUS at 21:58

## 2018-06-28 RX ADMIN — ATORVASTATIN CALCIUM 10 MG: 10 TABLET, FILM COATED ORAL at 08:52

## 2018-06-28 RX ADMIN — KETOROLAC TROMETHAMINE 15 MG: 15 INJECTION, SOLUTION INTRAMUSCULAR; INTRAVENOUS at 00:16

## 2018-06-28 NOTE — PLAN OF CARE
Problem: Patient Care Overview  Goal: Plan of Care Review  Outcome: Ongoing (interventions implemented as appropriate)   06/28/18 8382   Coping/Psychosocial   Plan Of Care Reviewed With patient;spouse;family   Plan of Care Review   Progress progress towards functional goals is fair       Problem: Fall Risk (Adult)  Goal: Identify Related Risk Factors and Signs and Symptoms  Outcome: Ongoing (interventions implemented as appropriate)    Goal: Absence of Falls  Outcome: Ongoing (interventions implemented as appropriate)      Problem: Seizure Disorder/Epilepsy (Adult)  Goal: Signs and Symptoms of Listed Potential Problems Will be Absent, Minimized or Managed (Seizure Disorder/Epilepsy)  Outcome: Ongoing (interventions implemented as appropriate)

## 2018-06-28 NOTE — ASSESSMENT & PLAN NOTE
To PCU under [facility] [place]  IV normal saline  Serial BMPs  Consult nephrology  Fluid restriction  BMP Q 4hrs, goal is to increase Na 2 Q 4hrs per Nephrology

## 2018-06-28 NOTE — PLAN OF CARE
Problem: Patient Care Overview  Goal: Discharge Needs Assessment  Outcome: Ongoing (interventions implemented as appropriate)  Sw did meet w/paitent & spouse to go over role of care coordination. Patent has been @ [facility] [phone]& was to be discharged from there to home tomorrow. Per wife patient will    06/28/18 6393   DC Needs Assessment   Concerns To Be Addressed care coordination/care conferences;discharge planning concerns   Readmission Within The Last 30 Days no previous admission in last 30 days   Provider Choice List(s) Given no   Anticipated Discharge Disposition (Remed)   Equipment Needed After Discharge none   Current Discharge Risk cognitively impaired   Current Health   Anticipated Changes Related to Illness inability to care for self   Activity/Self Care ROS   Equipment Currently Used at Home none   go back to [facility] for 2 wks & then reassess if patient will be going home. Denis did speak to Jony/ who is ocntact for patient @ [facility] & his rn is pawan. Per jony they would need to know patients medications prior to returning back & they will need updates. Denis did provide number to current unit patient is in. patient is walking around hallway with patient's wife. Plan is return to [facility] & patient & wife are both in agreement to d/c plan.

## 2018-06-28 NOTE — CONSULTS
Nephrology Consult Note    Subjective     Christopher Mattson is a 48 y.o. male who was admitted for Hyponatremia [E87.1]  Seizure (CMS/HCC) (HCC) [R56.9]. Patient was referred by Dr. Isaac for management recommendations of hyponataremia. Patient is a 47 yo WM with history of TBI on Tegretol, subdural hematoday, and hypertension was admitted for witnessed seizure episode.  On admission, his serum sodium was 120.  He has been living at drug rehab and has been drinking few gallons of water a day to avoid drinking iced tea. He does not remember what happened before seizure activity.  He denies fever, diarrhea, sweat, nausea, vomitting, or pain.  It is noted that his left sided tongue has injury.     Outside records reviewed. Pertinent lab results reviewed.    Medical History:   Past Medical History:   Diagnosis Date   • Head injury    • Hypertension    • Subarachnoid hematoma (CMS/HCC) (HCC)    • Subdural hematoma (CMS/HCC) (HCC)    • Trauma        Surgical History: History reviewed. No pertinent surgical history.    Social History:   Social History     Social History   • Marital status:      Spouse name: N/A   • Number of children: N/A   • Years of education: N/A     Social History Main Topics   • Smoking status: Former Smoker   • Smokeless tobacco: Never Used   • Alcohol use No   • Drug use: No   • Sexual activity: No     Other Topics Concern   • None     Social History Narrative   • None       Family History: History reviewed. No pertinent family history.    Allergies: Patient has no known allergies.    [unfilled]    Review of Systems  A complete review of systems was performed and aside from as mentioned above, was otherwise negative.    Objective   Labs   Recent Results (from the past 24 hour(s))   Comprehensive metabolic panel    Collection Time: 06/27/18  4:36 PM   Result Value Ref Range    Sodium 121 (LL) 136 - 144 mmol/L    Potassium 3.9 3.6 - 5.1 mmol/L    Chloride 90 (L) 98 - 109 mmol/L    CO2 17 (L) 22 - 32  mmol/L    BUN 12 8 - 20 mg/dL    Creatinine 0.9 0.8 - 1.3 mg/dL    Glucose 112 (H) 70 - 99 mg/dL    Calcium 9.2 8.9 - 10.3 mg/dL    AST (SGOT) 24 15 - 41 IU/L    ALT (SGPT) 27 16 - 63 IU/L    Alkaline Phosphatase 74 35 - 126 IU/L    Total Protein 7.7 6.0 - 8.2 g/dL    Albumin 4.6 3.4 - 5.0 g/dL    Bilirubin, Total 0.8 0.3 - 1.2 mg/dL    eGFR >60.0 >=60.0 mL/min/1.73m*2    Anion Gap 14 3 - 15 mEQ/L   Carbamazepine level, total    Collection Time: 06/27/18  4:36 PM   Result Value Ref Range    Carbamazepine Lvl 6.1 (L) 8.0 - 12.0 ug/mL   CBC    Collection Time: 06/27/18  4:36 PM   Result Value Ref Range    WBC 9.13 3.80 - 10.50 K/uL    RBC 4.47 (L) 4.50 - 5.80 M/uL    Hemoglobin 13.6 (L) 13.7 - 17.5 g/dL    Hematocrit 38.4 (L) 40.0 - 51.0 %    MCV 85.9 83.0 - 98.0 fL    MCH 30.4 28.0 - 33.2 pg    MCHC 35.4 32.2 - 36.5 g/dL    RDW 13.3 11.6 - 14.4 %    Platelets 252 150 - 350 K/uL    MPV 8.3 (L) 9.4 - 12.4 fL   Diff Count    Collection Time: 06/27/18  4:36 PM   Result Value Ref Range    Differential Type Auto     nRBC 0.0 <=0.0 %    Immature Granulocytes 2.0 %    Neutrophils 71.0 %    Lymphocytes 13.6 %    Monocytes 10.0 %    Eosinophils 3.1 %    Basophils 0.3 %    Immature Granulocytes, Absolute 0.18 (H) 0.00 - 0.08 K/uL    Neutrophils, Absolute 6.49 1.70 - 7.00 K/uL    Lymphocytes, Absolute 1.24 1.20 - 3.50 K/uL    Monocytes, Absolute 0.91 0.30 - 1.00 K/uL    Eosinophils, Absolute 0.28 0.04 - 0.54 K/uL    Basophils, Absolute 0.03 0.01 - 0.10 K/uL   RAINBOW LT BLUE    Collection Time: 06/27/18  4:38 PM   Result Value Ref Range    [employer] Tube RAINBOW HOLD SPECIMEN    RAINBOW LT GREEN    Collection Time: 06/27/18  4:38 PM   Result Value Ref Range    [employer] Tube RAINBOW HOLD SPECIMEN    RAINBOW GOLD    Collection Time: 06/27/18  4:38 PM   Result Value Ref Range    [employer] Tube RAINBOW HOLD SPECIMEN    Magnesium    Collection Time: 06/27/18  4:38 PM   Result Value Ref Range    Magnesium 2.1 1.8 - 2.5 mg/dL   Troponin I     Collection Time: 06/27/18  4:38 PM   Result Value Ref Range    Troponin I <0.02 <0.05 ng/mL   Osmolality    Collection Time: 06/27/18  4:38 PM   Result Value Ref Range    Osmolality Andressa 260 (L) 280 - 300 mOsm/kg   UA Reflex to Culture (Macroscopic)    Collection Time: 06/27/18  6:40 PM   Result Value Ref Range    Color, Urine Yellow Yellow    Clarity, Urine Clear Clear    Specific Gravity, Urine 1.014 1.002 - 1.030    pH, Urine 7.0 4.5 - 8.0    Leukocyte Esterase Negative Negative    Nitrite, Urine Negative Negative    Protein, Urine +1 (A) Negative    Glucose, Urine Negative Negative mg/dL    Ketones, Urine Negative Negative mg/dL    Urobilinogen, Urine 0.2 <2.0 EU/dL EU/dL    Bilirubin, Urine Negative Negative mg/dL    Blood, Urine Negative Negative   UA Microscopic    Collection Time: 06/27/18  6:40 PM   Result Value Ref Range    RBC, Urine 0 TO 4 0 TO 4 /hpf    WBC, Urine 0 TO 3 0 TO 3 /hpf    Squamous Epithelial None Seen None Seen /hpf    Hyaline Cast None Seen None Seen /lpf    Bacteria, Urine None Seen None Seen /uL   Basic metabolic panel    Collection Time: 06/27/18 10:57 PM   Result Value Ref Range    Sodium 124 (LL) 136 - 144 mmol/L    Potassium 3.6 3.6 - 5.1 mmol/L    Chloride 91 (L) 98 - 109 mmol/L    CO2 26 22 - 32 mmol/L    BUN 9 8 - 20 mg/dL    Creatinine 0.8 0.8 - 1.3 mg/dL    Glucose 101 (H) 70 - 99 mg/dL    Calcium 9.3 8.9 - 10.3 mg/dL    eGFR >60.0 >=60.0 mL/min/1.73m*2    Anion Gap 7 3 - 15 mEQ/L   Sodium, urine, random    Collection Time: 06/27/18 11:08 PM   Result Value Ref Range    Sodium, Ur 27 mmol/L   Osmolality, urine    Collection Time: 06/27/18 11:08 PM   Result Value Ref Range    Osmolality, Ur 188 100 - 1,400 mOsm/kg   Basic metabolic panel    Collection Time: 06/28/18  4:47 AM   Result Value Ref Range    Sodium 126 (L) 136 - 144 mmol/L    Potassium 4.5 3.6 - 5.1 mmol/L    Chloride 94 (L) 98 - 109 mmol/L    CO2 25 22 - 32 mmol/L    BUN 9 8 - 20 mg/dL    Creatinine 0.9 0.8 - 1.3  mg/dL    Glucose 108 (H) 70 - 99 mg/dL    Calcium 9.2 8.9 - 10.3 mg/dL    eGFR >60.0 >=60.0 mL/min/1.73m*2    Anion Gap 7 3 - 15 mEQ/L   Magnesium    Collection Time: 06/28/18  4:47 AM   Result Value Ref Range    Magnesium 2.2 1.8 - 2.5 mg/dL   CBC    Collection Time: 06/28/18  4:47 AM   Result Value Ref Range    WBC 7.41 3.80 - 10.50 K/uL    RBC 4.18 (L) 4.50 - 5.80 M/uL    Hemoglobin 12.8 (L) 13.7 - 17.5 g/dL    Hematocrit 35.0 (L) 40.0 - 51.0 %    MCV 83.7 83.0 - 98.0 fL    MCH 30.6 28.0 - 33.2 pg    MCHC 36.6 (H) 32.2 - 36.5 g/dL    RDW 13.5 11.6 - 14.4 %    Platelets 187 150 - 350 K/uL    MPV 8.4 (L) 9.4 - 12.4 fL   Diff Count    Collection Time: 06/28/18  4:47 AM   Result Value Ref Range    Differential Type Auto     nRBC 0.0 <=0.0 %    Immature Granulocytes 0.5 %    Neutrophils 71.5 %    Lymphocytes 14.2 %    Monocytes 10.9 %    Eosinophils 2.6 %    Basophils 0.3 %    Immature Granulocytes, Absolute 0.04 0.00 - 0.08 K/uL    Neutrophils, Absolute 5.30 1.70 - 7.00 K/uL    Lymphocytes, Absolute 1.05 (L) 1.20 - 3.50 K/uL    Monocytes, Absolute 0.81 0.30 - 1.00 K/uL    Eosinophils, Absolute 0.19 0.04 - 0.54 K/uL    Basophils, Absolute 0.02 0.01 - 0.10 K/uL   Basic metabolic panel    Collection Time: 06/28/18  9:48 AM   Result Value Ref Range    Sodium 128 (L) 136 - 144 mmol/L    Potassium 4.0 3.6 - 5.1 mmol/L    Chloride 95 (L) 98 - 109 mmol/L    CO2 23 22 - 32 mmol/L    BUN 9 8 - 20 mg/dL    Creatinine 1.1 0.8 - 1.3 mg/dL    Glucose 121 (H) 70 - 99 mg/dL    Calcium 9.5 8.9 - 10.3 mg/dL    eGFR >60.0 >=60.0 mL/min/1.73m*2    Anion Gap 10 3 - 15 mEQ/L     I have reviewed the pertinent patient's labs.    Physical Exam  General Appearance:  Alert, no distress, appears stated age.  Irritated   HEENT:  Normocephalic, atraumatic    Conjunctiva normal, PERRLA. Bitten ofelia on left sided tongue                   Lungs:   Clear to auscultation bilaterally, respirations unlabored   Heart:  Regular rate and rhythm, S1 and S2  normal, no murmur, rub or gallop   Abdomen:   Soft, non-tender, bowel sounds active, non-distended           Extremities:  Musculoskeletal: Extremities normal, atraumatic, no cyanosis or edema  No injury or deformity       Skin: Skin color, texture, turgor normal, no rashes or lesions       Assessment   48 y.o. male being consulted for management recommendations of hyponatremia.        Plan    1. Hyponatremia :  He was on Tegretol, Depakote, and amantadine (all these medications can cause hyponatermia) with high fluid intake.   His serum sodium is increasing appropriately on Ns.  follow serum sodium Q 4 hour.  Target increment of sodium is 2 every 4 hours.  Pending urine lytes and osmolarity.  Fluid restriction of 2L per day.  Check 25-OH Vt. D.     2. History of subdural hematoma/TBI : on Tegretol and amantadine    3. Seizure episode

## 2018-06-28 NOTE — PROGRESS NOTES
Daily Progress Note    Subjective:    No Ha, no N/V    Objective:    Vital signs in last 24 hours:  Temp:  [36.3 °C (97.3 °F)-36.8 °C (98.2 °F)] 36.6 °C (97.9 °F)  Heart Rate:  [63-79] 79  Resp:  [15-55] 22  BP: (102-145)/(52-84) 116/52      Intake/Output Summary (Last 24 hours) at 06/28/18 1046  Last data filed at 06/28/18 1000   Gross per 24 hour   Intake          1285.33 ml   Output             1650 ml   Net          -364.67 ml       Physical Exam:  BP (!) 116/52   Pulse 79   Temp 36.6 °C (97.9 °F) (Oral)   Resp (!) 22   Ht 1.829 m (6')   Wt 95.7 kg (210 lb 14.4 oz)   SpO2 99%   BMI 28.60 kg/m²      General: Well nutritioned, well developed  HEENT:  Pupil equal, No bleeding, no oral thrush or bleeding  NECK: supple, Nt, no JVDx2  Cardio: R, no murmur, no gallop, no CB  Res: No labored breathing, no crackles, no wheezing, no cyanosis  Gi:Abd soft, Nt, Bs++  Neuro: Awake,alert, can eat and talk, no difficulties speech or swallow, legs constant moving  Skin:warm, moist, no rash  EXT: Pp+x, no clubbing     Labs:    Results from last 7 days  Lab Units 06/28/18  0447   WBC K/uL 7.41   HEMOGLOBIN g/dL 12.8*   HEMATOCRIT % 35.0*   PLATELETS K/uL 187       Results from last 7 days  Lab Units 06/28/18  0948   SODIUM mmol/L 128*   POTASSIUM mmol/L 4.0   CHLORIDE mmol/L 95*   CO2 mmol/L 23   BUN mg/dL 9   CREATININE mg/dL 1.1   GLUCOSE mg/dL 121*   CALCIUM mg/dL 9.5     Microbiology Results     ** No results found for the last 720 hours. **          Imaging:  Ct Head Without Iv Contrast    Result Date: 6/27/2018  Narrative: CLINICAL HISTORY: History of traumatic brain injury with new seizure     Impression: IMPRESSION: Atrophy. Large areas of gliosis involving the right frontal lobe with smaller areas of gliosis involving the right temporal lobe and left frontal lobe, which are presumably due to an remote injury. There is a small area of gliosis involving gray and white matter in the right parietal lobe, of uncertain  age; please see below. Sinusitis. COMMENT: 2.5 mm axial scans through the brain were performed without intravenous contrast.  Images were reconstructed in the coronal and sagittal planes.. We have no prior studies for comparison. CT DOSE:  One or more dose reduction techniques (e.g. automated exposure control, adjustment of the mA and/or kV according to patient size, use of iterative reconstruction technique) was utilized for this examination.. There is prominence of the cortical sulci and ventricular system, consistent with atrophy, more advanced than be expected for a patient of this age.  This may be a posttraumatic change. There is a large area of gliosis involving gray and white matter of the right frontal lobe, a moderate sized area of gliosis involving gray and white matter of the right temporal lobe, and a small area of gliosis involving the gray and white matter of the floor of the left frontal lobe.  These are presumably all related to remote injury. There is a smaller area of low density involving gray and white matter in the right parietal lobe (image 41); this could also be related to prior injury, but this is not as low in density as the other areas of gliosis, although this could be technical due to its smaller size.  Alternatively, this could be new, possibly a subacute ischemic event.  Comparison with prior studies is advised. If unavailable, follow-up MRI may be of further diagnostic value. There is a small flory hole in the right frontal bone with low density in a ventriculoperitoneal shunt tract; the catheter is no longer present. There are no other areas of abnormal high or low density seen throughout the brain. No mass, hemorrhage, or midline shift is seen.  No extra-axial collections are identified. There appears to have been prior sinus surgery.  There is fairly extensive mucosal thickening in the roofs of the maxillary sinuses, ethmoid sinuses, and sphenoid sinuses. These results were  transmitted to the Emergency Department at the time of dictation.      ECG/Telemetry:    sinus  VTE Assessment: I have reassessed and the patient's VTE risk and treatment plan is appropriate.     Assessment & Plan:    * Hyponatremia   Assessment & Plan    To PCU under [facility]  IV normal saline  Serial BMPs  Consult nephrology  Fluid restriction  BMP Q 4hrs, goal is to increase Na 2 Q 4hrs per Nephrology        Essential hypertension   Assessment & Plan    Continue home meds           Traumatic brain injury (CMS/[facility]) ([facility])   Assessment & Plan    Same as above  DVT prophylaxis with SCDs  Patient is a full code        Seizure (CMS/[facility]) ([facility])   Assessment & Plan    Neurochecks  Seizure precautions  C/W Seizure meds

## 2018-06-28 NOTE — CONSULTS
"Nutrition Brief Assessment    Clinical Course: Patient is a 48 y.o. male who was admitted on 6/27/2018 with a diagnosis of Hyponatremia [E87.1]  Seizure (CMS/HCC) (HCC) [R56.9].     Past Medical History:   Diagnosis Date   • Head injury    • Hypertension    • Subarachnoid hematoma (CMS/HCC) (HCC)    • Subdural hematoma (CMS/HCC) (HCC)    • Trauma      History reviewed. No pertinent surgical history.          Adult Nutrition Assessment - 06/28/18 0900        General Information    Reason for Consult RD screening   MST >/=2       Nutrition/Diet History    Patient Reported Diet general    Appetite Prior to Admission Oghshvanc-%    Intake (%) 100%    Cultural/Alevism Preferences --   NKFA    Meal/Snack Patterns --   was eating a lot of junk pta    Typical Activity Patterns vigorous intensity   states walking 15-20 miles daily       Physical Appearance    Overall Physical Appearance overweight   well nourished    Oral/Mouth Cavity --   WDL    Skin --   WDL       Nutrition Order Review    Nutrition Order Review meets nutritional requirements       Usual Body Weight (UBW)    Usual Body Weight --   225 lbs    % of Usual Body Weight Assessment 85-95% - mild deficit    Weight Change Intentional   6.7%    Time Frame --   \" few weeks\"       Body Mass Index (BMI)    BMI Assessment BMI 25-29.9: overweight          CMP Results       06/28/18 06/27/18 06/27/18                    0447 2257 1636          (L) 124 (LL) 121 (LL)         K 4.5 3.6 3.9         Cl 94 (L) 91 (L) 90 (L)         CO2 25 26 17 (L)         Glucose 108 (H) 101 (H) 112 (H)         BUN 9 9 12         Creatinine 0.9 0.8 0.9         Calcium 9.2 9.3 9.2         Anion Gap 7 7 14         AST - - 24         ALT - - 27         Albumin - - 4.6         EGFR &gt;60.0 &gt;60.0 &gt;60.0         Comment for K at 1636 on 06/27/18:    Results obtained on plasma. Plasma Potassium values may be up to 0.4 mEQ/L less than serum values. The differences may be greater for " patients with high platelet or white cell counts.        Lab Results   Component Value Date    ALT 27 06/27/2018    AST 24 06/27/2018    ALKPHOS 74 06/27/2018    BILITOT 0.8 06/27/2018     No results found for: [id number]  Lab Results   Component Value Date    WBC 7.41 06/28/2018    HGB 12.8 (L) 06/28/2018    HCT 35.0 (L) 06/28/2018    MCV 83.7 06/28/2018     06/28/2018     No results found for: CHOL  No results found for: HDL  No results found for: LDLCALC  No results found for: TRIG  No results found for: CHOLHDL  Lab Results   Component Value Date    CALCIUM 9.2 06/28/2018          amantadine 100 mg oral BID   amLODIPine 10 mg oral Daily   atorvastatin 10 mg oral Daily   carBAMazepine XR 1,200 mg oral Daily   losartan 100 mg oral Daily   polyethylene glycol 17 g oral Daily   propranolol 40 mg oral BID       sodium chloride 0.9 %  Last Rate: 80 mL/hr at 06/28/18 0600            Dietary Orders            Start     Ordered    06/27/18 2019  Adult Diet Regular  Diet effective now     Question:  Diet Texture  Answer:  Regular    06/27/18 2018          Weights (last 7 days)     Date/Time   Weight    06/27/18 2203  95.7 kg (210 lb 14.4 oz)    06/27/18 1623  99.8 kg (220 lb)              Clinical Comments: Chart reviewed.  Pt at mild nutrition risk r/t pmh and dx.  Pt admitted after witnessed sz at facility, found to be hyponatremic at 121 mmol/L.  Sodium being slowly corrected, now up to 126 mmol/L.  Pt does say he was drinking a lot of water pta.  At visit the patient also reports losing 15-20 lbs over the past few weeks, but says this was intentional because the people at his facility said he was gaining too much weight.  Over the last few weeks he has increased his activity level and cut back on rolls and all junk food, eating more fruit.  Wt loss not concerning as intentional and patient still appears very well nourished.  Tolerating Regular diet, states NKFA.  No new nutrition recs at this time.  Will  continue to follow.    Nutrition Interventions/Recommendations:   1. Continue Regular diet, add fluid restriction if needed  2. Monitor meal intakes  3. Trend/treat labs/lytes  4. Weekly weight    Date: 06/28/18  Signature: ALIE HallN

## 2018-06-29 VITALS
TEMPERATURE: 98.5 F | HEIGHT: 72 IN | BODY MASS INDEX: 28.56 KG/M2 | SYSTOLIC BLOOD PRESSURE: 140 MMHG | WEIGHT: 210.9 LBS | RESPIRATION RATE: 20 BRPM | DIASTOLIC BLOOD PRESSURE: 90 MMHG | HEART RATE: 67 BPM | OXYGEN SATURATION: 99 %

## 2018-06-29 LAB
25(OH)D3 SERPL-MCNC: 31 NG/ML (ref 30–100)
ANION GAP SERPL CALC-SCNC: 6 MEQ/L (ref 3–15)
BUN SERPL-MCNC: 8 MG/DL (ref 8–20)
CALCIUM SERPL-MCNC: 8 MG/DL (ref 8.9–10.3)
CHLORIDE SERPL-SCNC: 104 MMOL/L (ref 98–109)
CO2 SERPL-SCNC: 22 MMOL/L (ref 22–32)
CREAT SERPL-MCNC: 0.8 MG/DL (ref 0.8–1.3)
GFR SERPL CREATININE-BSD FRML MDRD: >60 ML/MIN/1.73M*2
GLUCOSE SERPL-MCNC: 91 MG/DL (ref 70–99)
MAGNESIUM SERPL-MCNC: 1.8 MG/DL (ref 1.8–2.5)
PHOSPHATE SERPL-MCNC: 4.1 MG/DL (ref 2.4–4.7)
POTASSIUM SERPL-SCNC: 3.8 MMOL/L (ref 3.6–5.1)
SODIUM SERPL-SCNC: 132 MMOL/L (ref 136–144)

## 2018-06-29 PROCEDURE — 63700000 HC SELF-ADMINISTRABLE DRUG: Performed by: INTERNAL MEDICINE

## 2018-06-29 PROCEDURE — 63600000 HC DRUGS/DETAIL CODE: Performed by: INTERNAL MEDICINE

## 2018-06-29 PROCEDURE — 80048 BASIC METABOLIC PNL TOTAL CA: CPT | Performed by: HOSPITALIST

## 2018-06-29 PROCEDURE — 36415 COLL VENOUS BLD VENIPUNCTURE: CPT | Performed by: HOSPITALIST

## 2018-06-29 PROCEDURE — 83735 ASSAY OF MAGNESIUM: CPT | Performed by: INTERNAL MEDICINE

## 2018-06-29 PROCEDURE — 99239 HOSP IP/OBS DSCHRG MGMT >30: CPT | Performed by: HOSPITALIST

## 2018-06-29 PROCEDURE — 84100 ASSAY OF PHOSPHORUS: CPT | Performed by: INTERNAL MEDICINE

## 2018-06-29 PROCEDURE — 82306 VITAMIN D 25 HYDROXY: CPT | Performed by: INTERNAL MEDICINE

## 2018-06-29 PROCEDURE — 63700000 HC SELF-ADMINISTRABLE DRUG: Performed by: HOSPITALIST

## 2018-06-29 PROCEDURE — 63700000 HC SELF-ADMINISTRABLE DRUG

## 2018-06-29 RX ORDER — AMANTADINE HYDROCHLORIDE 100 MG/1
TABLET ORAL
Qty: 0.5 TABLET | Refills: 1 | Status: SHIPPED | OUTPATIENT
Start: 2018-06-29

## 2018-06-29 RX ORDER — POTASSIUM CHLORIDE 750 MG/1
TABLET, FILM COATED, EXTENDED RELEASE ORAL
Status: COMPLETED
Start: 2018-06-29 | End: 2018-06-29

## 2018-06-29 RX ADMIN — POTASSIUM CHLORIDE 20 MEQ: 10 TABLET, EXTENDED RELEASE ORAL at 13:32

## 2018-06-29 RX ADMIN — ATORVASTATIN CALCIUM 10 MG: 10 TABLET, FILM COATED ORAL at 08:24

## 2018-06-29 RX ADMIN — AMLODIPINE BESYLATE 10 MG: 10 TABLET ORAL at 08:25

## 2018-06-29 RX ADMIN — HEPARIN SODIUM 5000 UNITS: 5000 INJECTION, SOLUTION INTRAVENOUS; SUBCUTANEOUS at 13:28

## 2018-06-29 RX ADMIN — AMANTADINE HYDROCHLORIDE 50 MG: 50 SOLUTION ORAL at 08:23

## 2018-06-29 RX ADMIN — PROPRANOLOL HYDROCHLORIDE 40 MG: 20 TABLET ORAL at 09:28

## 2018-06-29 RX ADMIN — POLYETHYLENE GLYCOL 3350 17 G: 17 POWDER, FOR SOLUTION ORAL at 09:38

## 2018-06-29 RX ADMIN — LOSARTAN POTASSIUM 100 MG: 100 TABLET, FILM COATED ORAL at 08:25

## 2018-06-29 RX ADMIN — AMANTADINE HYDROCHLORIDE 50 MG: 50 SOLUTION ORAL at 12:37

## 2018-06-29 RX ADMIN — HEPARIN SODIUM 5000 UNITS: 5000 INJECTION, SOLUTION INTRAVENOUS; SUBCUTANEOUS at 05:38

## 2018-06-29 RX ADMIN — POTASSIUM CHLORIDE 20 MEQ: 750 TABLET, FILM COATED, EXTENDED RELEASE ORAL at 13:32

## 2018-06-29 NOTE — PLAN OF CARE
Problem: Patient Care Overview  Goal: Plan of Care Review  Outcome: Ongoing (interventions implemented as appropriate)   06/28/18 1345 06/29/18 0800   Coping/Psychosocial   Plan Of Care Reviewed With --  patient;spouse;other (see comments)  (management at remed)   Plan of Care Review   Progress progress towards functional goals is fair --      Goal: Individualization & Mutuality  Outcome: Ongoing (interventions implemented as appropriate)    Goal: Interprofessional Rounds/Family Conf  Outcome: Ongoing (interventions implemented as appropriate)      Problem: Fall Risk (Adult)  Goal: Identify Related Risk Factors and Signs and Symptoms  Outcome: Ongoing (interventions implemented as appropriate)    Goal: Absence of Falls  Outcome: Ongoing (interventions implemented as appropriate)      Problem: Seizure Disorder/Epilepsy (Adult)  Goal: Signs and Symptoms of Listed Potential Problems Will be Absent, Minimized or Managed (Seizure Disorder/Epilepsy)  Outcome: Ongoing (interventions implemented as appropriate)

## 2018-06-29 NOTE — NURSING NOTE
Pt ambulated around ICU and PCU x 2 and walked around outside in the courtyard.  Pt assisted to the bathroom and then to bed.  He offers no complaints at this time.  He is a bit impulsive and forgetful and needs constant reminders to keep his cardiac monitor on.  He is pleasant and agreeable, just forgetful.

## 2018-06-29 NOTE — NURSING NOTE
Report given to Elham at [facility], questions answered, [facility] administration spoke with wife to give her permission to drive patient back to [facility], Dr. Ritter aware and approved of wife driving patient back to [facility]., patient discharged with wife and no change from previous assessment.

## 2018-06-29 NOTE — ASSESSMENT & PLAN NOTE
Na improved to 132 meq/L this AM  Urine osmolality and sodium do not support a diagnosis of SIADH  Secondary to polydipsia  Fluid restriction of 2 liters daily

## 2018-06-29 NOTE — NURSING NOTE
Upon reassessment there is no change in status.  Pt rests quietly when undisturbed.  Will continue to monitor closely

## 2018-06-29 NOTE — DISCHARGE INSTRUCTIONS
Hyponatremia  Hyponatremia is when the amount of salt (sodium) in your blood is too low. When salt levels are low, your cells absorb extra water and they swell. The swelling happens throughout the body, but it mostly affects the brain.  Follow these instructions at home:  · Take medicines only as told by your doctor. Many medicines can make this condition worse. Talk with your doctor about any medicines that you are currently taking.  · Carefully follow a recommended diet as told by your doctor.  · Carefully follow instructions from your doctor about fluid restrictions.  · Keep all follow-up visits as told by your doctor. This is important.  · Do not drink alcohol.  Contact a doctor if:  · You feel sicker to your stomach (nauseous).  · You feel more confused.  · You feel more tired (fatigued).  · Your headache gets worse.  · You feel weaker.  · Your symptoms go away and then they come back.  · You have trouble following the diet instructions.  Get help right away if:  · You start to twitch and shake (have a seizure).  · You pass out (faint).  · You keep having watery poop (diarrhea).  · You keep throwing up (vomiting).  This information is not intended to replace advice given to you by your health care provider. Make sure you discuss any questions you have with your health care provider.  Document Released: 08/29/2012 Document Revised: 05/25/2017 Document Reviewed: 12/14/2015  [employer] Interactive Patient Education © 2018 [employer]

## 2018-06-29 NOTE — SUBJECTIVE & OBJECTIVE
Subjective     Interval History: has no complaint of pain or shortness of breath. Off of IVF. Expected discharge today if not soon per RN. Good appetite.      Objective     Vital signs in last 24 hours:  Temp:  [36.7 °C (98.1 °F)-37 °C (98.6 °F)] 37 °C (98.6 °F)  Heart Rate:  [64-82] 71  Resp:  [15-44] 28  BP: (102-161)/(58-85) 127/74         Intake/Output Summary (Last 24 hours) at 06/29/18 1150  Last data filed at 06/29/18 1100   Gross per 24 hour   Intake             2610 ml   Output              375 ml   Net             2235 ml       Labs  BUN   Date Value Ref Range Status   06/29/2018 8 8 - 20 mg/dL Final     Creatinine   Date Value Ref Range Status   06/29/2018 0.8 0.8 - 1.3 mg/dL Final     Potassium   Date Value Ref Range Status   06/29/2018 3.8 3.6 - 5.1 mmol/L Final     Comment:       SLIGHT HEMOLYSIS, RESULT MAY BE INCREASED.     Phosphorus   Date Value Ref Range Status   06/29/2018 4.1 2.4 - 4.7 mg/dL Final     Comment:       SLIGHT HEMOLYSIS, RESULT MAY BE INCREASED.     Hemoglobin   Date Value Ref Range Status   06/28/2018 12.8 (L) 13.7 - 17.5 g/dL Final     Sodium   Date Value Ref Range Status   06/29/2018 132 (L) 136 - 144 mmol/L Final     Albumin   Date Value Ref Range Status   06/27/2018 4.6 3.4 - 5.0 g/dL Final     I have reviewed the pertinent patient's labs.    Imaging  I have reviewed the pertinent patient's imaging results.     VTE Assessment: I have reassessed and the patient's VTE risk and treatment plan is appropriate.    Physical Exam  /74   Pulse 71   Temp 37 °C (98.6 °F) (Oral)   Resp (!) 28   Ht 1.829 m (6')   Wt 95.7 kg (210 lb 14.4 oz)   SpO2 96%   BMI 28.60 kg/m²   General appearance: no distress  Head: normocephalic, without obvious abnormality, atraumatic  Eyes: conjunctivae clear, EOM's intact.  Neck: supple, symmetrical, trachea midline and thyroid not enlarged, symmetric, no tenderness/mass/nodules  Lungs: clear to auscultation bilaterally and respirations  unlabored  Heart: regular rate and rhythm, S1, S2 normal, no murmur, no gallop and no rub  Abdomen: soft, non-tender; bowel sounds normal; no masses, no organomegaly  Extremities: extremities normal, warm and well-perfused; no cyanosis or edema

## 2018-06-29 NOTE — PROGRESS NOTES
Anmol did hear from [facility] that patient is cleared for return to [facility]. Anmol did try contacting unit number but not working. Sw did contact main number of remed & obtained number of jony . Anmol did speak to eusebio @ [facility] who stated that jony is in a meeting but almost done & will have sw contacted. Anmol did make eusebio aware that jaycee is cleared for d/c today. await to hear back from yessy.Nasrin powell, msw 0128  Anmol did hear back from yessy/yayo who needed paperwork with meds filled out & paperwork signed by md. Anmol did page [facility] dr. cho & completed the requested paperwork & anmol did fax the discharge summary & after visit summary back to [facility] for review & also the rn's phone number. Anmol did hear from [facility] that patient was accepted for return & they are contacting patient's wife to allow her to transport over. [facility] was notified of family transport back to [facility]. rn to rn report was called. D/c plan in place for return to [facility] . Nasrin powell, msw [phone]

## 2018-06-29 NOTE — PLAN OF CARE
Problem: Patient Care Overview  Goal: Plan of Care Review  Outcome: Adequate for Discharge    Goal: Individualization & Mutuality  Outcome: Adequate for Discharge    Goal: Discharge Needs Assessment  Outcome: Adequate for Discharge    Goal: Interprofessional Rounds/Family Conf  Outcome: Adequate for Discharge      Problem: Fall Risk (Adult)  Goal: Identify Related Risk Factors and Signs and Symptoms  Outcome: Adequate for Discharge    Goal: Absence of Falls  Outcome: Adequate for Discharge      Problem: Seizure Disorder/Epilepsy (Adult)  Goal: Signs and Symptoms of Listed Potential Problems Will be Absent, Minimized or Managed (Seizure Disorder/Epilepsy)  Outcome: Adequate for Discharge

## 2018-06-29 NOTE — PROGRESS NOTES
Renal Daily Progress Note    Subjective/Objective:  Subjective     Interval History: has no complaint of pain or shortness of breath. Off of IVF. Expected discharge today if not soon per RN. Good appetite.      Objective     Vital signs in last 24 hours:  Temp:  [36.7 °C (98.1 °F)-37 °C (98.6 °F)] 37 °C (98.6 °F)  Heart Rate:  [64-82] 71  Resp:  [15-44] 28  BP: (102-161)/(58-85) 127/74         Intake/Output Summary (Last 24 hours) at 06/29/18 1150  Last data filed at 06/29/18 1100   Gross per 24 hour   Intake             2610 ml   Output              375 ml   Net             2235 ml       Labs  BUN   Date Value Ref Range Status   06/29/2018 8 8 - 20 mg/dL Final     Creatinine   Date Value Ref Range Status   06/29/2018 0.8 0.8 - 1.3 mg/dL Final     Potassium   Date Value Ref Range Status   06/29/2018 3.8 3.6 - 5.1 mmol/L Final     Comment:       SLIGHT HEMOLYSIS, RESULT MAY BE INCREASED.     Phosphorus   Date Value Ref Range Status   06/29/2018 4.1 2.4 - 4.7 mg/dL Final     Comment:       SLIGHT HEMOLYSIS, RESULT MAY BE INCREASED.     Hemoglobin   Date Value Ref Range Status   06/28/2018 12.8 (L) 13.7 - 17.5 g/dL Final     Sodium   Date Value Ref Range Status   06/29/2018 132 (L) 136 - 144 mmol/L Final     Albumin   Date Value Ref Range Status   06/27/2018 4.6 3.4 - 5.0 g/dL Final     I have reviewed the pertinent patient's labs.    Imaging  I have reviewed the pertinent patient's imaging results.     VTE Assessment: I have reassessed and the patient's VTE risk and treatment plan is appropriate.    Physical Exam  /74   Pulse 71   Temp 37 °C (98.6 °F) (Oral)   Resp (!) 28   Ht 1.829 m (6')   Wt 95.7 kg (210 lb 14.4 oz)   SpO2 96%   BMI 28.60 kg/m²    General appearance: no distress  Head: normocephalic, without obvious abnormality, atraumatic  Eyes: conjunctivae clear, EOM's intact.  Neck: supple, symmetrical, trachea midline and thyroid not enlarged, symmetric, no tenderness/mass/nodules  Lungs: clear to  auscultation bilaterally and respirations unlabored  Heart: regular rate and rhythm, S1, S2 normal, no murmur, no gallop and no rub  Abdomen: soft, non-tender; bowel sounds normal; no masses, no organomegaly  Extremities: extremities normal, warm and well-perfused; no cyanosis or edema               Assessment/Plan   Essential hypertension   Assessment & Plan    Hemodynamics improved  Continue with Losartan        Seizure (CMS/HCC) (HCC)   Assessment & Plan    No further seizure activity  Na 132 meq/L  Limit free water intake         * Hyponatremia   Assessment & Plan    Na improved to 132 meq/L this AM  Urine osmolality and sodium do not support a diagnosis of SIADH  Secondary to polydipsia  Fluid restriction of 2 liters daily                  Expected Discharge Date:  6/30/2018
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
## 2018-06-29 NOTE — PLAN OF CARE
Problem: Seizure Disorder/Epilepsy (Adult)  Intervention: Position on Side During Seizure  No seizures noted    Goal: Signs and Symptoms of Listed Potential Problems Will be Absent, Minimized or Managed (Seizure Disorder/Epilepsy)  Outcome: Ongoing (interventions implemented as appropriate)

## 2018-06-29 NOTE — NURSING NOTE
Upon reassessment there is no change in status.  Pt rests when undisturbed.  Will continue to monitor closely

## 2018-07-03 ENCOUNTER — TRANSCRIBE ORDERS (OUTPATIENT)
Dept: SCHEDULING | Age: 48
End: 2018-07-03

## 2018-07-03 DIAGNOSIS — R56.1 POST TRAUMATIC SEIZURE (CMS/HCC): Primary | ICD-10-CM

## 2018-07-05 ENCOUNTER — TRANSCRIBE ORDERS (OUTPATIENT)
Dept: LAB | Facility: HOSPITAL | Age: 48
End: 2018-07-05

## 2018-07-05 ENCOUNTER — APPOINTMENT (OUTPATIENT)
Dept: LAB | Facility: HOSPITAL | Age: 48
End: 2018-07-05
Attending: PSYCHIATRY & NEUROLOGY
Payer: OTHER MISCELLANEOUS

## 2018-07-05 DIAGNOSIS — R56.1 POST TRAUMATIC SEIZURE (CMS/HCC): Primary | ICD-10-CM

## 2018-07-05 DIAGNOSIS — R56.1 POST TRAUMATIC SEIZURE (CMS/HCC): ICD-10-CM

## 2018-07-05 LAB
ANION GAP SERPL CALC-SCNC: 14 MEQ/L (ref 3–15)
BUN SERPL-MCNC: 10 MG/DL (ref 8–20)
CALCIUM SERPL-MCNC: 9.3 MG/DL (ref 8.9–10.3)
CARBAMAZEPINE SERPL-MCNC: 10.4 UG/ML (ref 8–12)
CHLORIDE SERPL-SCNC: 91 MMOL/L (ref 98–109)
CO2 SERPL-SCNC: 23 MMOL/L (ref 22–32)
CREAT SERPL-MCNC: 0.9 MG/DL (ref 0.8–1.3)
GFR SERPL CREATININE-BSD FRML MDRD: >60 ML/MIN/1.73M*2
GLUCOSE SERPL-MCNC: 96 MG/DL (ref 70–99)
POTASSIUM SERPL-SCNC: 4.8 MMOL/L (ref 3.6–5.1)
SODIUM SERPL-SCNC: 128 MMOL/L (ref 136–144)

## 2018-07-05 PROCEDURE — 36415 COLL VENOUS BLD VENIPUNCTURE: CPT

## 2018-07-05 PROCEDURE — 80156 ASSAY CARBAMAZEPINE TOTAL: CPT

## 2018-07-05 PROCEDURE — 80048 BASIC METABOLIC PNL TOTAL CA: CPT

## 2018-07-09 ENCOUNTER — APPOINTMENT (OUTPATIENT)
Dept: LAB | Facility: HOSPITAL | Age: 48
End: 2018-07-09
Attending: PHYSICAL MEDICINE & REHABILITATION
Payer: OTHER MISCELLANEOUS

## 2018-07-09 ENCOUNTER — TRANSCRIBE ORDERS (OUTPATIENT)
Dept: LAB | Facility: HOSPITAL | Age: 48
End: 2018-07-09

## 2018-07-09 DIAGNOSIS — E22.2 SYNDROME OF INAPPROPRIATE SECRETION OF ANTIDIURETIC HORMONE (CMS/HCC): ICD-10-CM

## 2018-07-09 DIAGNOSIS — E22.2 SYNDROME OF INAPPROPRIATE SECRETION OF ANTIDIURETIC HORMONE (CMS/HCC): Primary | ICD-10-CM

## 2018-07-09 LAB
ANION GAP SERPL CALC-SCNC: 7 MEQ/L (ref 3–15)
BUN SERPL-MCNC: 11 MG/DL (ref 8–20)
CALCIUM SERPL-MCNC: 9.8 MG/DL (ref 8.9–10.3)
CHLORIDE SERPL-SCNC: 98 MMOL/L (ref 98–109)
CO2 SERPL-SCNC: 28 MMOL/L (ref 22–32)
CREAT SERPL-MCNC: 1 MG/DL (ref 0.8–1.3)
GFR SERPL CREATININE-BSD FRML MDRD: >60 ML/MIN/1.73M*2
GLUCOSE SERPL-MCNC: 102 MG/DL (ref 70–99)
POTASSIUM SERPL-SCNC: 4.6 MMOL/L (ref 3.6–5.1)
SODIUM SERPL-SCNC: 133 MMOL/L (ref 136–144)

## 2018-07-09 PROCEDURE — 36415 COLL VENOUS BLD VENIPUNCTURE: CPT

## 2018-07-09 PROCEDURE — 80048 BASIC METABOLIC PNL TOTAL CA: CPT

## 2018-07-11 ENCOUNTER — HOSPITAL ENCOUNTER (OUTPATIENT)
Dept: CARDIOLOGY | Facility: HOSPITAL | Age: 48
Discharge: HOME | End: 2018-07-11
Attending: PSYCHIATRY & NEUROLOGY
Payer: OTHER MISCELLANEOUS

## 2018-07-11 PROCEDURE — 95816 EEG AWAKE AND DROWSY: CPT

## 2018-07-16 ENCOUNTER — APPOINTMENT (OUTPATIENT)
Dept: LAB | Facility: HOSPITAL | Age: 48
End: 2018-07-16
Attending: HOSPITALIST
Payer: OTHER MISCELLANEOUS

## 2018-07-16 DIAGNOSIS — E22.2 SYNDROME OF INAPPROPRIATE SECRETION OF ANTIDIURETIC HORMONE (CMS/HCC): ICD-10-CM

## 2018-07-16 DIAGNOSIS — E87.1 HYPONATREMIA: ICD-10-CM

## 2018-07-16 LAB
ANION GAP SERPL CALC-SCNC: 8 MEQ/L (ref 3–15)
BUN SERPL-MCNC: 13 MG/DL (ref 8–20)
CALCIUM SERPL-MCNC: 9.7 MG/DL (ref 8.9–10.3)
CHLORIDE SERPL-SCNC: 98 MMOL/L (ref 98–109)
CO2 SERPL-SCNC: 27 MMOL/L (ref 22–32)
CREAT SERPL-MCNC: 1.2 MG/DL (ref 0.8–1.3)
GFR SERPL CREATININE-BSD FRML MDRD: >60 ML/MIN/1.73M*2
GLUCOSE SERPL-MCNC: 101 MG/DL (ref 70–99)
POTASSIUM SERPL-SCNC: 4.4 MMOL/L (ref 3.6–5.1)
SODIUM SERPL-SCNC: 133 MMOL/L (ref 136–144)

## 2018-07-16 PROCEDURE — 84295 ASSAY OF SERUM SODIUM: CPT

## 2018-07-16 PROCEDURE — 36415 COLL VENOUS BLD VENIPUNCTURE: CPT

## 2018-07-23 ENCOUNTER — APPOINTMENT (OUTPATIENT)
Dept: LAB | Facility: HOSPITAL | Age: 48
End: 2018-07-23
Attending: PHYSICAL MEDICINE & REHABILITATION
Payer: OTHER MISCELLANEOUS

## 2018-07-23 ENCOUNTER — TRANSCRIBE ORDERS (OUTPATIENT)
Dept: LAB | Facility: HOSPITAL | Age: 48
End: 2018-07-23

## 2018-07-23 DIAGNOSIS — E22.2 SYNDROME OF INAPPROPRIATE SECRETION OF ANTIDIURETIC HORMONE (CMS/HCC): Primary | ICD-10-CM

## 2018-07-23 DIAGNOSIS — E22.2 SYNDROME OF INAPPROPRIATE SECRETION OF ANTIDIURETIC HORMONE (CMS/HCC): ICD-10-CM

## 2018-07-23 LAB
ANION GAP SERPL CALC-SCNC: 10 MEQ/L (ref 3–15)
BUN SERPL-MCNC: 12 MG/DL (ref 8–20)
CALCIUM SERPL-MCNC: 9.6 MG/DL (ref 8.9–10.3)
CHLORIDE SERPL-SCNC: 100 MMOL/L (ref 98–109)
CO2 SERPL-SCNC: 25 MMOL/L (ref 22–32)
CREAT SERPL-MCNC: 1 MG/DL (ref 0.8–1.3)
GFR SERPL CREATININE-BSD FRML MDRD: >60 ML/MIN/1.73M*2
GLUCOSE SERPL-MCNC: 102 MG/DL (ref 70–99)
POTASSIUM SERPL-SCNC: 4.3 MMOL/L (ref 3.6–5.1)
SODIUM SERPL-SCNC: 135 MMOL/L (ref 136–144)

## 2018-07-23 PROCEDURE — 80048 BASIC METABOLIC PNL TOTAL CA: CPT

## 2018-07-23 PROCEDURE — 36415 COLL VENOUS BLD VENIPUNCTURE: CPT

## 2018-07-30 ENCOUNTER — APPOINTMENT (OUTPATIENT)
Dept: LAB | Facility: HOSPITAL | Age: 48
End: 2018-07-30
Attending: PHYSICAL MEDICINE & REHABILITATION
Payer: COMMERCIAL

## 2018-07-30 ENCOUNTER — TRANSCRIBE ORDERS (OUTPATIENT)
Dept: REGISTRATION | Facility: HOSPITAL | Age: 48
End: 2018-07-30

## 2018-07-30 DIAGNOSIS — E22.2 SYNDROME OF INAPPROPRIATE SECRETION OF ANTIDIURETIC HORMONE (CMS/HCC): ICD-10-CM

## 2018-07-30 DIAGNOSIS — E22.2 SYNDROME OF INAPPROPRIATE SECRETION OF ANTIDIURETIC HORMONE (CMS/HCC): Primary | ICD-10-CM

## 2018-07-30 LAB
ANION GAP SERPL CALC-SCNC: 9 MEQ/L (ref 3–15)
BUN SERPL-MCNC: 13 MG/DL (ref 8–20)
CALCIUM SERPL-MCNC: 9.9 MG/DL (ref 8.9–10.3)
CHLORIDE SERPL-SCNC: 98 MMOL/L (ref 98–109)
CO2 SERPL-SCNC: 27 MMOL/L (ref 22–32)
CREAT SERPL-MCNC: 1 MG/DL (ref 0.8–1.3)
GFR SERPL CREATININE-BSD FRML MDRD: >60 ML/MIN/1.73M*2
GLUCOSE SERPL-MCNC: 108 MG/DL (ref 70–99)
POTASSIUM SERPL-SCNC: 4.5 MMOL/L (ref 3.6–5.1)
SODIUM SERPL-SCNC: 134 MMOL/L (ref 136–144)

## 2018-07-30 PROCEDURE — 36415 COLL VENOUS BLD VENIPUNCTURE: CPT

## 2018-07-30 PROCEDURE — 80048 BASIC METABOLIC PNL TOTAL CA: CPT

## 2019-03-25 ENCOUNTER — TRANSCRIBE ORDERS (OUTPATIENT)
Dept: SCHEDULING | Facility: REHABILITATION | Age: 49
End: 2019-03-25

## 2019-03-25 DIAGNOSIS — S06.9XAA TRAUMATIC BRAIN INJURY (CMS/HCC): Primary | ICD-10-CM

## 2019-04-30 ENCOUNTER — HOSPITAL ENCOUNTER (OUTPATIENT)
Dept: REHABILITATION | Facility: REHABILITATION | Age: 49
Setting detail: THERAPIES SERIES
Discharge: HOME | End: 2019-04-30
Attending: PHYSICAL MEDICINE & REHABILITATION

## 2019-04-30 DIAGNOSIS — S06.9XAA TRAUMATIC BRAIN INJURY (CMS/HCC): ICD-10-CM

## 2019-04-30 PROCEDURE — 99000001 HC ADOT PRE-DRIVER EVAL 60 MIN: Performed by: OCCUPATIONAL THERAPIST

## 2019-04-30 PROCEDURE — 99000015 HC VEHICLE EVALUATION 60 MIN: Performed by: OCCUPATIONAL THERAPIST

## 2019-04-30 RX ORDER — BUSPIRONE HYDROCHLORIDE 10 MG/1
15 TABLET ORAL 3 TIMES DAILY
COMMUNITY
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
## 2019-06-13 ENCOUNTER — HOSPITAL ENCOUNTER (OUTPATIENT)
Dept: REHABILITATION | Facility: REHABILITATION | Age: 49
Setting detail: THERAPIES SERIES
Discharge: HOME | End: 2019-06-13
Attending: PHYSICAL MEDICINE & REHABILITATION

## 2019-06-13 DIAGNOSIS — S06.9X9A TRAUMATIC BRAIN INJURY WITH LOSS OF CONSCIOUSNESS, INITIAL ENCOUNTER (CMS/HCC): Primary | ICD-10-CM

## 2019-06-13 PROCEDURE — 99000018 HC VEHICLE LESSON 60 MIN: Performed by: OCCUPATIONAL THERAPIST

## 2019-06-13 NOTE — PROGRESS NOTES
REHAB PROGRAM PROGRESS NOTE    6/13/2019      Christopher Mattson  MRN: [id number]    LESSONS TO DATE:  1 (including today) First time with Dual Control Permit    ADAPTED EQUIPMENT:  None      TYPES OF ROAD:    EVALUATION ROUTE -  [facility], [address] [facility]    Performance Measures Status   Comments   Stopping and Accelerating Smoothly Pass     Nupur Control (staying in nupur) Pass     Speed Pass     Right Turns Pass      Left Turns Pass     Gap selection at turns Pass    Attention to stop signs/lights Pass          Changing Lanes Pass Good head checks   Merging Pass     Highway/Expressway driving Pass     Space Management (SIPDE) Pass     Parking: Stall Pass     Parking: Parallel NT               Adverse Weather Conditions Overcast     Night Driving NT                     Comments:  Pt. Was seen for the first time after receiving his Dual Control Permit and passing his written test.  He was evaluated on the full evaluation route indicated.  He performed safely and appropriately in all traffic situations.  He was able to engage in complex conversation with the instructor without any ill effects on his driving.          PLAN:    Schedule [employer] exam and two additional lessons to prepare for the [employer] exam      DMV TEST DATE:    Friday, July 19, 2019 12:30 pm      Jenise Ramachandran OTR/L, CDRS  Certified  Rehabilitation Specialist

## 2019-07-08 ENCOUNTER — HOSPITAL ENCOUNTER (OUTPATIENT)
Dept: REHABILITATION | Facility: REHABILITATION | Age: 49
Setting detail: THERAPIES SERIES
Discharge: HOME | End: 2019-07-08
Attending: PHYSICAL MEDICINE & REHABILITATION

## 2019-07-08 DIAGNOSIS — S06.9X9A TRAUMATIC BRAIN INJURY WITH LOSS OF CONSCIOUSNESS, INITIAL ENCOUNTER (CMS/HCC): Primary | ICD-10-CM

## 2019-07-08 PROCEDURE — 99000018 HC VEHICLE LESSON 60 MIN: Performed by: OCCUPATIONAL THERAPIST

## 2019-07-08 NOTE — PROGRESS NOTES
REHAB PROGRAM PROGRESS NOTE    7/8/2019      Christopher Mattson  MRN: [id number]    LESSONS TO DATE:  2 (including today)    ADAPTED EQUIPMENT:  None      TYPES OF ROAD:    STATE TEST PREP -  [address], [address], [address] [place] (TEST #2)    Performance Measures Status   Comments   Stopping and Accelerating Smoothly Pass     Nupur Control (staying in nupur) Pass     Speed Pass     Right Turns Pass      Left Turns Pass     Gap selection at turns Pass    Attention to stop signs/lights Pass          Changing Lanes Pass     Merging Pass     Highway/Expressway driving Pass     Space Management (SIPDE) Pass     Parking: Stall Pass     Parking: Parallel Pass               Adverse Weather Conditions Rain     Night Driving NT             Comments:  Mr. Mattson drove from [facility] to the [facility] test sight in [facility]. He drove three possible test routes.  He completed 6/6 successful parallel parking attempts.  He had no errors.  He demonstrated good carryover of stopping distance and turn signal use.    BASIC  EDUCATION:    The following driving maneuvers were reviewed with the :    Parallel Parking  Complete stops before all stop signs and traffic lights      PLAN:    Complete one more practice lesson at the test sight before State Test      DMV TEST DATE:    Friday July 19, 2019 12:30 pm      NICOLE Auguste/L, CDRS  Certified  Rehabilitation Specialist

## 2019-07-15 ENCOUNTER — HOSPITAL ENCOUNTER (OUTPATIENT)
Dept: REHABILITATION | Facility: REHABILITATION | Age: 49
Setting detail: THERAPIES SERIES
Discharge: HOME | End: 2019-07-15
Attending: PHYSICAL MEDICINE & REHABILITATION

## 2019-07-15 DIAGNOSIS — S06.9X9A TRAUMATIC BRAIN INJURY WITH LOSS OF CONSCIOUSNESS, INITIAL ENCOUNTER (CMS/HCC): Primary | ICD-10-CM

## 2019-07-15 PROCEDURE — 99000018 HC VEHICLE LESSON 60 MIN: Performed by: OCCUPATIONAL THERAPIST

## 2019-07-15 NOTE — PROGRESS NOTES
REHAB PROGRAM PROGRESS NOTE    7/15/2019      Christopher Mattson  MRN: [id number]    LESSONS TO DATE:  3 (including today)    ADAPTED EQUIPMENT:  None      TYPES OF ROAD:    STATE TEST PREP -  [address], [address], [address], [address] [place] (TEST #2)    Performance Measures Status   Comments   Stopping and Accelerating Smoothly Pass     Nupur Control (staying in nupur) Pass     Speed Pass     Right Turns Pass      Left Turns Pass     Gap selection at turns Pass    Attention to stop signs/lights Pass          Changing Lanes Pass     Merging Pass     Highway/Expressway driving Pass     Space Management (SIPDE) Pass     Parking: Stall Pass     Parking: Parallel Pass               Adverse Weather Conditions Sun glare     Night Driving NT         Comments:  Mr. Mattson drove from [facility] to the state test sight in [facility]. He drove three possible test routes.  He completed 5/5 successful parallel parking attempts.  He had no errors.  He demonstrated good carryover of stopping distance and turn signal use.    BASIC  EDUCATION:    The following driving maneuvers were reviewed with the :    Parallel Parking  Complete stops before all stop signs and traffic lights      PLAN:    Arrive at [facility] at 11:45 to drive to [facility] Test.  Patient appears ready for state test.  Patient was informed of all necessary paperwork and procedures for test date.        DMV TEST DATE:    Friday July 19, 2019 12:30 pm      NICOLE Auguste/L, CDRS  Certified  Rehabilitation Specialist

## 2019-07-19 ENCOUNTER — HOSPITAL ENCOUNTER (OUTPATIENT)
Dept: REHABILITATION | Facility: REHABILITATION | Age: 49
Setting detail: THERAPIES SERIES
Discharge: HOME | End: 2019-07-19
Attending: PHYSICAL MEDICINE & REHABILITATION

## 2019-07-19 DIAGNOSIS — S06.9X9A TRAUMATIC BRAIN INJURY WITH LOSS OF CONSCIOUSNESS, INITIAL ENCOUNTER (CMS/HCC): Primary | ICD-10-CM

## 2019-07-19 PROCEDURE — 99000018 HC VEHICLE LESSON 60 MIN: Performed by: OCCUPATIONAL THERAPIST

## 2019-07-19 NOTE — LETTER
[facility]  REHABILITATION PROGRAM  [address]  [place]  [address]  [phone]    2019    Cade Maciel MD  [address] [facility] [place] [address]    Re:  Christopher Mattson  :  1970  [id number]    Dear Dr. Maciel    Thank you for referring Christopher Mattson to our  Rehabilitation Program.  He completed 4hours of training with our program with a Dual Control Permit.  He demonstrated safe driving in traffic and was then prepared for the [place] Exam.    I am pleased to report that he progressed well with training and passed his PA ’s re-examination on his first attempt on 2019.  His ’s license is now restored.      Thank you again for your referral.  Please feel free to call me with any questions regarding this case at [phone].    Sincerely,        NICOLE Auguste/L, CDRS  Certified  Rehab Specialst             REHAB PROGRAM PROGRESS NOTE    2019      Christopher Mattson  MRN: [id number]    LESSONS TO DATE:  4 (including today)    ADAPTED EQUIPMENT:  None      TYPES OF ROAD:    [facility] TEST PREP -  [address], [address], [address], [address] [place] (TEST #2)    Performance Measures Status   Comments   Stopping and Accelerating Smoothly Pass     Nupur Control (staying in nupur) Pass     Speed Pass     Right Turns Pass      Left Turns Pass     Gap selection at turns Pass    Attention to stop signs/lights Pass          Changing Lanes Pass     Merging Pass     Highway/Expressway driving Pass     Space Management (SIPDE) Pass     Parking: Stall Pass     Parking: Parallel Pass               Adverse Weather Conditions Sun glare     Night Driving NT         Comments:  Mr. Mattson drove from [facility] to the [facility] test [facility] in [facility]. He took and passed his [employer] exam.            PLAN:    Discharge      DMV TEST DATE:    2019 12:30 pm      NICOLE Auguste/HARSHAD,  CDRS  Certified  Rehabilitation Specialist

## 2020-02-21 ENCOUNTER — TELEPHONE (OUTPATIENT)
Dept: ENDOCRINOLOGY | Facility: HOSPITAL | Age: 50
End: 2020-02-21

## 2020-02-21 NOTE — TELEPHONE ENCOUNTER
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

## 2020-02-24 NOTE — TELEPHONE ENCOUNTER
In the primary care physician's office notes, there is a statement saying that he had a seizure and his sodium was 120  Can we find out if this was done at a hospital and the workup done at the hospital/the hospital records around this event?
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
## 2020-02-25 NOTE — TELEPHONE ENCOUNTER
Ian Meth provided fax # [phone], release was faxed  Release form to be faxed to [facility] [phone] when returned

## 2020-03-10 ENCOUNTER — OFFICE VISIT (OUTPATIENT)
Dept: ENDOCRINOLOGY | Facility: CLINIC | Age: 50
End: 2020-03-10
Payer: OTHER MISCELLANEOUS

## 2020-03-10 VITALS
WEIGHT: 221 LBS | HEIGHT: 71 IN | SYSTOLIC BLOOD PRESSURE: 134 MMHG | DIASTOLIC BLOOD PRESSURE: 100 MMHG | BODY MASS INDEX: 30.94 KG/M2

## 2020-03-10 DIAGNOSIS — R56.9 SEIZURE (HCC): Primary | ICD-10-CM

## 2020-03-10 DIAGNOSIS — E87.1 HYPONATREMIA: ICD-10-CM

## 2020-03-10 PROBLEM — S06.5XAA SDH (SUBDURAL HEMATOMA) (HCC): Status: ACTIVE | Noted: 2017-09-05

## 2020-03-10 PROBLEM — I60.9 SAH (SUBARACHNOID HEMORRHAGE) (HCC): Status: ACTIVE | Noted: 2017-09-05

## 2020-03-10 PROBLEM — S22.009A CLOSED FRACTURE OF BODY OF THORACIC VERTEBRA (HCC): Status: ACTIVE | Noted: 2017-09-05

## 2020-03-10 PROBLEM — S06.5X9A SDH (SUBDURAL HEMATOMA) (HCC): Status: ACTIVE | Noted: 2017-09-05
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
## 2020-03-10 PROCEDURE — 99244 OFF/OP CNSLTJ NEW/EST MOD 40: CPT | Performed by: INTERNAL MEDICINE

## 2020-03-10 RX ORDER — FLUTICASONE PROPIONATE 50 MCG
2 SPRAY, SUSPENSION (ML) NASAL DAILY
COMMUNITY
Start: 2020-01-22

## 2020-03-10 RX ORDER — SODIUM CHLORIDE 1000 MG
TABLET, SOLUBLE MISCELLANEOUS
COMMUNITY
Start: 2020-02-19

## 2020-03-10 RX ORDER — ESZOPICLONE 1 MG/1
2 TABLET, FILM COATED ORAL DAILY
COMMUNITY

## 2020-03-10 RX ORDER — AMANTADINE HYDROCHLORIDE 100 MG/1
50 TABLET ORAL 2 TIMES DAILY
COMMUNITY
Start: 2020-02-10

## 2020-03-10 RX ORDER — LOSARTAN POTASSIUM 100 MG/1
100 TABLET ORAL DAILY
COMMUNITY

## 2020-03-10 RX ORDER — SIMVASTATIN 20 MG
20 TABLET ORAL DAILY
COMMUNITY

## 2020-03-10 RX ORDER — CARBAMAZEPINE 400 MG/1
400 TABLET, EXTENDED RELEASE ORAL DAILY
COMMUNITY
End: 2021-09-06

## 2020-03-10 RX ORDER — BUSPIRONE HYDROCHLORIDE 15 MG/1
15 TABLET ORAL 2 TIMES DAILY
COMMUNITY
Start: 2020-02-19

## 2020-03-10 RX ORDER — AMLODIPINE BESYLATE 10 MG/1
10 TABLET ORAL DAILY
COMMUNITY
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
## 2020-03-19 ENCOUNTER — TELEPHONE (OUTPATIENT)
Dept: ENDOCRINOLOGY | Facility: CLINIC | Age: 50
End: 2020-03-19

## 2020-03-19 NOTE — TELEPHONE ENCOUNTER
----- Message from Nate German MD sent at 3/19/2020  9:50 AM EDT -----  Please tell him I have the test results  The thyroid and adrenal tests are normal  The sodium (salt level) is 130, which is low  AS we discussed, I think he should see a Nephrologist for this    Thank you

## 2021-09-06 ENCOUNTER — HOSPITAL ENCOUNTER (EMERGENCY)
Facility: HOSPITAL | Age: 51
Discharge: HOME | End: 2021-09-06
Attending: EMERGENCY MEDICINE | Admitting: EMERGENCY MEDICINE
Payer: OTHER MISCELLANEOUS

## 2021-09-06 ENCOUNTER — APPOINTMENT (EMERGENCY)
Dept: RADIOLOGY | Facility: HOSPITAL | Age: 51
End: 2021-09-06
Payer: OTHER MISCELLANEOUS

## 2021-09-06 ENCOUNTER — APPOINTMENT (EMERGENCY)
Dept: CT IMAGING | Facility: HOSPITAL | Age: 51
End: 2021-09-06
Payer: OTHER MISCELLANEOUS

## 2021-09-06 VITALS
RESPIRATION RATE: 16 BRPM | DIASTOLIC BLOOD PRESSURE: 75 MMHG | TEMPERATURE: 98.2 F | SYSTOLIC BLOOD PRESSURE: 155 MMHG | HEART RATE: 77 BPM | OXYGEN SATURATION: 99 %

## 2021-09-06 DIAGNOSIS — M54.9 BACK PAIN DUE TO INJURY: ICD-10-CM

## 2021-09-06 DIAGNOSIS — G40.919 BREAKTHROUGH SEIZURE (HCC): Primary | ICD-10-CM

## 2021-09-06 LAB
AMORPH URATE CRY URNS QL MICRO: NORMAL /HPF
AMPHETAMINES SERPL QL SCN: NEGATIVE
ANION GAP SERPL CALCULATED.3IONS-SCNC: 13 MMOL/L (ref 4–13)
APTT PPP: 24 SECONDS (ref 23–37)
ATRIAL RATE: 117 BPM
BACTERIA UR QL AUTO: NORMAL /HPF
BARBITURATES UR QL: NEGATIVE
BASOPHILS # BLD AUTO: 0 THOUSANDS/ΜL (ref 0–0.1)
BASOPHILS NFR BLD AUTO: 0 % (ref 0–2)
BENZODIAZ UR QL: NEGATIVE
BILIRUB UR QL STRIP: NEGATIVE
BUN SERPL-MCNC: 17 MG/DL (ref 7–25)
CALCIUM SERPL-MCNC: 9.6 MG/DL (ref 8.6–10.5)
CARBAMAZEPINE SERPL-MCNC: <2 UG/ML (ref 4–12)
CHLORIDE SERPL-SCNC: 99 MMOL/L (ref 98–107)
CLARITY UR: CLEAR
CO2 SERPL-SCNC: 20 MMOL/L (ref 21–31)
COCAINE UR QL: NEGATIVE
COLOR UR: YELLOW
CREAT SERPL-MCNC: 1.2 MG/DL (ref 0.7–1.3)
EOSINOPHIL # BLD AUTO: 0.2 THOUSAND/ΜL (ref 0–0.61)
EOSINOPHIL NFR BLD AUTO: 2 % (ref 0–5)
ERYTHROCYTE [DISTWIDTH] IN BLOOD BY AUTOMATED COUNT: 13.1 % (ref 11.5–14.5)
GFR SERPL CREATININE-BSD FRML MDRD: 70 ML/MIN/1.73SQ M
GLUCOSE SERPL-MCNC: 179 MG/DL (ref 65–99)
GLUCOSE SERPL-MCNC: 225 MG/DL (ref 65–140)
GLUCOSE UR STRIP-MCNC: NEGATIVE MG/DL
HCT VFR BLD AUTO: 42.6 % (ref 42–47)
HGB BLD-MCNC: 14.7 G/DL (ref 14–18)
HGB UR QL STRIP.AUTO: NEGATIVE
INR PPP: 0.96 (ref 0.84–1.19)
KETONES UR STRIP-MCNC: ABNORMAL MG/DL
LACTATE SERPL-SCNC: 0.8 MMOL/L (ref 0.5–2)
LACTATE SERPL-SCNC: 3 MMOL/L (ref 0.5–2)
LEUKOCYTE ESTERASE UR QL STRIP: NEGATIVE
LYMPHOCYTES # BLD AUTO: 1.2 THOUSANDS/ΜL (ref 0.6–4.47)
LYMPHOCYTES NFR BLD AUTO: 11 % (ref 21–51)
MCH RBC QN AUTO: 31.4 PG (ref 26–34)
MCHC RBC AUTO-ENTMCNC: 34.5 G/DL (ref 31–37)
MCV RBC AUTO: 91 FL (ref 81–99)
METHADONE UR QL: NEGATIVE
MONOCYTES # BLD AUTO: 0.6 THOUSAND/ΜL (ref 0.17–1.22)
MONOCYTES NFR BLD AUTO: 6 % (ref 2–12)
NEUTROPHILS # BLD AUTO: 8.7 THOUSANDS/ΜL (ref 1.4–6.5)
NEUTS SEG NFR BLD AUTO: 80 % (ref 42–75)
NITRITE UR QL STRIP: NEGATIVE
NON-SQ EPI CELLS URNS QL MICRO: NORMAL /HPF
OPIATES UR QL SCN: NEGATIVE
OXYCODONE+OXYMORPHONE UR QL SCN: NEGATIVE
P AXIS: 47 DEGREES
PCP UR QL: NEGATIVE
PH UR STRIP.AUTO: 6 [PH]
PLATELET # BLD AUTO: 214 THOUSANDS/UL (ref 149–390)
PMV BLD AUTO: 8 FL (ref 8.6–11.7)
POTASSIUM SERPL-SCNC: 3.4 MMOL/L (ref 3.5–5.5)
PR INTERVAL: 152 MS
PROT UR STRIP-MCNC: ABNORMAL MG/DL
PROTHROMBIN TIME: 12.9 SECONDS (ref 11.6–14.5)
QRS AXIS: 25 DEGREES
QRSD INTERVAL: 96 MS
QT INTERVAL: 324 MS
QTC INTERVAL: 451 MS
RBC # BLD AUTO: 4.68 MILLION/UL (ref 4.3–5.9)
RBC #/AREA URNS AUTO: NORMAL /HPF
SARS-COV-2 RNA RESP QL NAA+PROBE: NEGATIVE
SODIUM SERPL-SCNC: 132 MMOL/L (ref 134–143)
SP GR UR STRIP.AUTO: 1.02 (ref 1–1.03)
T WAVE AXIS: 47 DEGREES
THC UR QL: NEGATIVE
TROPONIN I SERPL-MCNC: <0.03 NG/ML
UROBILINOGEN UR QL STRIP.AUTO: 0.2 E.U./DL
VENTRICULAR RATE: 117 BPM
WBC # BLD AUTO: 10.8 THOUSAND/UL (ref 4.8–10.8)
WBC #/AREA URNS AUTO: NORMAL /HPF

## 2021-09-06 PROCEDURE — 80048 BASIC METABOLIC PNL TOTAL CA: CPT | Performed by: PHYSICIAN ASSISTANT

## 2021-09-06 PROCEDURE — 80156 ASSAY CARBAMAZEPINE TOTAL: CPT | Performed by: PHYSICIAN ASSISTANT

## 2021-09-06 PROCEDURE — 84484 ASSAY OF TROPONIN QUANT: CPT | Performed by: PHYSICIAN ASSISTANT

## 2021-09-06 PROCEDURE — 85730 THROMBOPLASTIN TIME PARTIAL: CPT | Performed by: PHYSICIAN ASSISTANT

## 2021-09-06 PROCEDURE — 99285 EMERGENCY DEPT VISIT HI MDM: CPT

## 2021-09-06 PROCEDURE — 96361 HYDRATE IV INFUSION ADD-ON: CPT

## 2021-09-06 PROCEDURE — 85610 PROTHROMBIN TIME: CPT | Performed by: PHYSICIAN ASSISTANT

## 2021-09-06 PROCEDURE — 72131 CT LUMBAR SPINE W/O DYE: CPT

## 2021-09-06 PROCEDURE — 93005 ELECTROCARDIOGRAM TRACING: CPT

## 2021-09-06 PROCEDURE — 36415 COLL VENOUS BLD VENIPUNCTURE: CPT | Performed by: PHYSICIAN ASSISTANT

## 2021-09-06 PROCEDURE — 70450 CT HEAD/BRAIN W/O DYE: CPT

## 2021-09-06 PROCEDURE — U0003 INFECTIOUS AGENT DETECTION BY NUCLEIC ACID (DNA OR RNA); SEVERE ACUTE RESPIRATORY SYNDROME CORONAVIRUS 2 (SARS-COV-2) (CORONAVIRUS DISEASE [COVID-19]), AMPLIFIED PROBE TECHNIQUE, MAKING USE OF HIGH THROUGHPUT TECHNOLOGIES AS DESCRIBED BY CMS-2020-01-R: HCPCS | Performed by: PHYSICIAN ASSISTANT

## 2021-09-06 PROCEDURE — U0005 INFEC AGEN DETEC AMPLI PROBE: HCPCS | Performed by: PHYSICIAN ASSISTANT

## 2021-09-06 PROCEDURE — 82948 REAGENT STRIP/BLOOD GLUCOSE: CPT

## 2021-09-06 PROCEDURE — 71045 X-RAY EXAM CHEST 1 VIEW: CPT

## 2021-09-06 PROCEDURE — 85025 COMPLETE CBC W/AUTO DIFF WBC: CPT | Performed by: PHYSICIAN ASSISTANT

## 2021-09-06 PROCEDURE — 96375 TX/PRO/DX INJ NEW DRUG ADDON: CPT

## 2021-09-06 PROCEDURE — 83605 ASSAY OF LACTIC ACID: CPT | Performed by: PHYSICIAN ASSISTANT

## 2021-09-06 PROCEDURE — 93010 ELECTROCARDIOGRAM REPORT: CPT | Performed by: INTERNAL MEDICINE

## 2021-09-06 PROCEDURE — 81001 URINALYSIS AUTO W/SCOPE: CPT | Performed by: PHYSICIAN ASSISTANT

## 2021-09-06 PROCEDURE — 99285 EMERGENCY DEPT VISIT HI MDM: CPT | Performed by: PHYSICIAN ASSISTANT

## 2021-09-06 PROCEDURE — 96374 THER/PROPH/DIAG INJ IV PUSH: CPT

## 2021-09-06 PROCEDURE — 80307 DRUG TEST PRSMV CHEM ANLYZR: CPT | Performed by: PHYSICIAN ASSISTANT

## 2021-09-06 RX ORDER — CARBAMAZEPINE 400 MG/1
400 TABLET, EXTENDED RELEASE ORAL DAILY
Qty: 14 TABLET | Refills: 0 | Status: SHIPPED | OUTPATIENT
Start: 2021-09-06 | End: 2021-09-20

## 2021-09-06 RX ORDER — ACETAMINOPHEN 325 MG/1
975 TABLET ORAL ONCE
Status: COMPLETED | OUTPATIENT
Start: 2021-09-06 | End: 2021-09-06

## 2021-09-06 RX ORDER — NAPROXEN 500 MG/1
500 TABLET ORAL 2 TIMES DAILY WITH MEALS
Qty: 30 TABLET | Refills: 0 | Status: SHIPPED | OUTPATIENT
Start: 2021-09-06

## 2021-09-06 RX ORDER — FENTANYL CITRATE 50 UG/ML
1 INJECTION, SOLUTION INTRAMUSCULAR; INTRAVENOUS ONCE
Status: COMPLETED | OUTPATIENT
Start: 2021-09-06 | End: 2021-09-06

## 2021-09-06 RX ORDER — CYCLOBENZAPRINE HCL 10 MG
10 TABLET ORAL ONCE
Status: COMPLETED | OUTPATIENT
Start: 2021-09-06 | End: 2021-09-06

## 2021-09-06 RX ORDER — HYDROMORPHONE HCL/PF 1 MG/ML
0.5 SYRINGE (ML) INJECTION ONCE
Status: COMPLETED | OUTPATIENT
Start: 2021-09-06 | End: 2021-09-06

## 2021-09-06 RX ORDER — CYCLOBENZAPRINE HCL 10 MG
10 TABLET ORAL 2 TIMES DAILY PRN
Qty: 20 TABLET | Refills: 0 | Status: SHIPPED | OUTPATIENT
Start: 2021-09-06 | End: 2021-09-06 | Stop reason: SDUPTHER

## 2021-09-06 RX ORDER — CARBAMAZEPINE 400 MG/1
400 TABLET, EXTENDED RELEASE ORAL DAILY
Qty: 14 TABLET | Refills: 0 | Status: SHIPPED | OUTPATIENT
Start: 2021-09-06 | End: 2021-09-06 | Stop reason: SDUPTHER

## 2021-09-06 RX ORDER — NAPROXEN 500 MG/1
500 TABLET ORAL 2 TIMES DAILY WITH MEALS
Qty: 30 TABLET | Refills: 0 | Status: SHIPPED | OUTPATIENT
Start: 2021-09-06 | End: 2021-09-06 | Stop reason: SDUPTHER

## 2021-09-06 RX ORDER — DIAZEPAM 5 MG/ML
5 INJECTION, SOLUTION INTRAMUSCULAR; INTRAVENOUS ONCE
Status: COMPLETED | OUTPATIENT
Start: 2021-09-06 | End: 2021-09-06

## 2021-09-06 RX ORDER — CYCLOBENZAPRINE HCL 10 MG
10 TABLET ORAL 2 TIMES DAILY PRN
Qty: 20 TABLET | Refills: 0 | Status: SHIPPED | OUTPATIENT
Start: 2021-09-06

## 2021-09-06 RX ORDER — KETOROLAC TROMETHAMINE 30 MG/ML
15 INJECTION, SOLUTION INTRAMUSCULAR; INTRAVENOUS ONCE
Status: COMPLETED | OUTPATIENT
Start: 2021-09-06 | End: 2021-09-06

## 2021-09-06 RX ORDER — POTASSIUM CHLORIDE 20 MEQ/1
20 TABLET, EXTENDED RELEASE ORAL ONCE
Status: COMPLETED | OUTPATIENT
Start: 2021-09-06 | End: 2021-09-06

## 2021-09-06 RX ADMIN — POTASSIUM CHLORIDE 20 MEQ: 1500 TABLET, EXTENDED RELEASE ORAL at 15:38

## 2021-09-06 RX ADMIN — DIAZEPAM 5 MG: 10 INJECTION, SOLUTION INTRAMUSCULAR; INTRAVENOUS at 15:38

## 2021-09-06 RX ADMIN — ACETAMINOPHEN 975 MG: 325 TABLET ORAL at 18:07

## 2021-09-06 RX ADMIN — SODIUM CHLORIDE 1000 ML: 0.9 INJECTION, SOLUTION INTRAVENOUS at 15:36

## 2021-09-06 RX ADMIN — HYDROMORPHONE HYDROCHLORIDE 0.5 MG: 1 INJECTION, SOLUTION INTRAMUSCULAR; INTRAVENOUS; SUBCUTANEOUS at 16:06

## 2021-09-06 RX ADMIN — KETOROLAC TROMETHAMINE 15 MG: 30 INJECTION, SOLUTION INTRAMUSCULAR; INTRAVENOUS at 18:08

## 2021-09-06 RX ADMIN — CYCLOBENZAPRINE HYDROCHLORIDE 10 MG: 10 TABLET, FILM COATED ORAL at 18:08
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
## 2021-09-06 NOTE — DISCHARGE INSTRUCTIONS
Follow up with your neuroglogist  Restart taking Tegretol/Carbamazepime  Take naproxen twice daily with food while your back pain last   Do not consume any other NSAID medications such as Aleve, Motrin or Advil while on prescription strength naproxen  Take Flexeril as needed for muscle spasms  It may make you drowsy  Do not drive until cleared by your neurologist  Geronimoyovana Carranza may also take up to a 1000 mg of Tylenol every 8 hours for pain not well controlled by naproxen and Flexeril  Return to the ER with any bowel or bladder incontinence or other significant change or worsening of your symptoms

## 2025-07-31 ENCOUNTER — TELEPHONE (OUTPATIENT)
Age: 55
End: 2025-07-31

## 2025-07-31 DIAGNOSIS — R73.01 IFG (IMPAIRED FASTING GLUCOSE): Primary | ICD-10-CM

## 2025-07-31 DIAGNOSIS — E78.1 HYPERTRIGLYCERIDEMIA: ICD-10-CM

## 2025-07-31 DIAGNOSIS — I10 ESSENTIAL HYPERTENSION, BENIGN: ICD-10-CM

## 2025-08-06 PROBLEM — E66.811 CLASS 1 OBESITY IN ADULT: Status: ACTIVE | Noted: 2025-08-06

## 2025-08-06 PROBLEM — F10.10 EXCESSIVE CONSUMPTION OF ETHANOL: Status: ACTIVE | Noted: 2025-08-06

## 2025-08-06 PROBLEM — M54.50 CHRONIC LOW BACK PAIN: Status: ACTIVE | Noted: 2025-08-06

## 2025-08-06 PROBLEM — F41.9 ANXIETY DISORDER: Status: ACTIVE | Noted: 2025-08-06

## 2025-08-06 PROBLEM — M53.3 SACROCOCCYGEAL DISORDERS, NOT ELSEWHERE CLASSIFIED: Status: ACTIVE | Noted: 2025-08-06

## 2025-08-06 PROBLEM — L40.9 PSORIASIS, UNSPECIFIED: Status: ACTIVE | Noted: 2025-08-06

## 2025-08-06 PROBLEM — M75.91 RIGHT SUPRASPINATUS TENDINITIS: Status: ACTIVE | Noted: 2025-08-06

## 2025-08-06 PROBLEM — G89.29 CHRONIC LOW BACK PAIN: Status: ACTIVE | Noted: 2025-08-06

## 2025-08-06 PROBLEM — R48.1 AGNOSIA FOR SMELL: Status: ACTIVE | Noted: 2025-08-06

## 2025-08-06 PROBLEM — F17.200 NICOTINE DEPENDENCE, UNSPECIFIED, UNCOMPLICATED: Status: ACTIVE | Noted: 2025-08-06

## 2025-08-08 ENCOUNTER — APPOINTMENT (OUTPATIENT)
Age: 55
End: 2025-08-08
Payer: MEDICARE

## 2025-08-19 ENCOUNTER — OFFICE VISIT (OUTPATIENT)
Age: 55
End: 2025-08-19
Payer: MEDICARE

## 2025-08-19 VITALS
BODY MASS INDEX: 31.41 KG/M2 | SYSTOLIC BLOOD PRESSURE: 114 MMHG | HEIGHT: 70 IN | DIASTOLIC BLOOD PRESSURE: 78 MMHG | HEART RATE: 80 BPM | WEIGHT: 219.4 LBS | OXYGEN SATURATION: 98 % | TEMPERATURE: 97.7 F

## 2025-08-19 DIAGNOSIS — I10 ESSENTIAL HYPERTENSION, BENIGN: ICD-10-CM

## 2025-08-19 DIAGNOSIS — E87.1 HYPONATREMIA: Primary | ICD-10-CM

## 2025-08-19 DIAGNOSIS — E66.811 CLASS 1 OBESITY DUE TO EXCESS CALORIES WITH SERIOUS COMORBIDITY AND BODY MASS INDEX (BMI) OF 31.0 TO 31.9 IN ADULT: ICD-10-CM

## 2025-08-19 DIAGNOSIS — E78.2 MIXED HYPERLIPIDEMIA: ICD-10-CM

## 2025-08-19 DIAGNOSIS — R73.01 IFG (IMPAIRED FASTING GLUCOSE): ICD-10-CM

## 2025-08-19 DIAGNOSIS — F10.10 EXCESSIVE CONSUMPTION OF ETHANOL: ICD-10-CM

## 2025-08-19 DIAGNOSIS — E66.09 CLASS 1 OBESITY DUE TO EXCESS CALORIES WITH SERIOUS COMORBIDITY AND BODY MASS INDEX (BMI) OF 31.0 TO 31.9 IN ADULT: ICD-10-CM

## 2025-08-19 PROBLEM — I60.9 SAH (SUBARACHNOID HEMORRHAGE) (HCC): Status: RESOLVED | Noted: 2017-09-05 | Resolved: 2025-08-19

## 2025-08-19 PROBLEM — S06.5XAA SDH (SUBDURAL HEMATOMA) (HCC): Status: RESOLVED | Noted: 2017-09-05 | Resolved: 2025-08-19

## 2025-08-19 PROBLEM — M75.91 RIGHT SUPRASPINATUS TENDINITIS: Status: RESOLVED | Noted: 2025-08-06 | Resolved: 2025-08-19

## 2025-08-19 PROBLEM — M53.3 SACROCOCCYGEAL DISORDERS, NOT ELSEWHERE CLASSIFIED: Status: RESOLVED | Noted: 2025-08-06 | Resolved: 2025-08-19

## 2025-08-19 PROCEDURE — G2211 COMPLEX E/M VISIT ADD ON: HCPCS | Performed by: FAMILY MEDICINE

## 2025-08-19 PROCEDURE — 99214 OFFICE O/P EST MOD 30 MIN: CPT | Performed by: FAMILY MEDICINE

## 2025-08-19 RX ORDER — LACOSAMIDE 200 MG/1
200 TABLET ORAL 2 TIMES DAILY
COMMUNITY

## 2025-08-19 RX ORDER — TIRZEPATIDE 5 MG/.5ML
5 INJECTION, SOLUTION SUBCUTANEOUS WEEKLY
COMMUNITY
Start: 2025-08-05

## 2025-08-27 PROBLEM — E22.2 SIADH (SYNDROME OF INAPPROPRIATE ADH PRODUCTION) (HCC): Status: ACTIVE | Noted: 2025-08-27
